# Patient Record
Sex: FEMALE | Race: WHITE | Employment: UNEMPLOYED | URBAN - METROPOLITAN AREA
[De-identification: names, ages, dates, MRNs, and addresses within clinical notes are randomized per-mention and may not be internally consistent; named-entity substitution may affect disease eponyms.]

---

## 2020-09-17 ENCOUNTER — VIRTUAL VISIT (OUTPATIENT)
Dept: FAMILY MEDICINE CLINIC | Age: 65
End: 2020-09-17

## 2020-09-17 DIAGNOSIS — E55.9 VITAMIN D DEFICIENCY: ICD-10-CM

## 2020-09-17 DIAGNOSIS — L30.9 ECZEMA, UNSPECIFIED TYPE: ICD-10-CM

## 2020-09-17 DIAGNOSIS — R91.1 PULMONARY NODULE: ICD-10-CM

## 2020-09-17 DIAGNOSIS — E89.0 POSTOPERATIVE HYPOTHYROIDISM: ICD-10-CM

## 2020-09-17 DIAGNOSIS — F31.81 BIPOLAR 2 DISORDER, MAJOR DEPRESSIVE EPISODE (HCC): ICD-10-CM

## 2020-09-17 DIAGNOSIS — M51.37 DEGENERATION OF LUMBAR OR LUMBOSACRAL INTERVERTEBRAL DISC: ICD-10-CM

## 2020-09-17 DIAGNOSIS — M35.9 AUTOIMMUNE DISEASE (HCC): ICD-10-CM

## 2020-09-17 DIAGNOSIS — I10 ESSENTIAL HYPERTENSION: ICD-10-CM

## 2020-09-17 DIAGNOSIS — J44.9 CHRONIC OBSTRUCTIVE PULMONARY DISEASE, UNSPECIFIED COPD TYPE (HCC): ICD-10-CM

## 2020-09-17 DIAGNOSIS — E78.2 MIXED HYPERLIPIDEMIA: ICD-10-CM

## 2020-09-17 DIAGNOSIS — E66.01 OBESITY, MORBID, BMI 40.0-49.9 (HCC): ICD-10-CM

## 2020-09-17 DIAGNOSIS — G56.02 CARPAL TUNNEL SYNDROME OF LEFT WRIST: ICD-10-CM

## 2020-09-17 DIAGNOSIS — M17.12 PRIMARY OSTEOARTHRITIS OF LEFT KNEE: ICD-10-CM

## 2020-09-17 DIAGNOSIS — K21.9 GASTROESOPHAGEAL REFLUX DISEASE, ESOPHAGITIS PRESENCE NOT SPECIFIED: ICD-10-CM

## 2020-09-17 DIAGNOSIS — E11.9 TYPE 2 DIABETES MELLITUS WITHOUT COMPLICATION, WITHOUT LONG-TERM CURRENT USE OF INSULIN (HCC): Primary | ICD-10-CM

## 2020-09-17 DIAGNOSIS — M79.7 FIBROMYALGIA: ICD-10-CM

## 2020-09-17 PROBLEM — E03.9 HYPOTHYROIDISM: Status: ACTIVE | Noted: 2018-10-19

## 2020-09-17 PROBLEM — F41.9 ANXIETY: Status: ACTIVE | Noted: 2018-10-29

## 2020-09-17 PROBLEM — F10.11 ALCOHOL ABUSE, IN REMISSION: Status: ACTIVE | Noted: 2018-10-29

## 2020-09-17 PROBLEM — M17.11 OSTEOARTHRITIS OF RIGHT KNEE: Status: ACTIVE | Noted: 2017-10-01

## 2020-09-17 PROBLEM — F10.11 ALCOHOL ABUSE, IN REMISSION: Status: RESOLVED | Noted: 2018-10-29 | Resolved: 2020-09-17

## 2020-09-17 PROBLEM — Q79.0 BOCHDALEK HERNIA: Status: ACTIVE | Noted: 2020-02-18

## 2020-09-17 RX ORDER — HYDROCHLOROTHIAZIDE 12.5 MG/1
12.5 CAPSULE ORAL DAILY
COMMUNITY
Start: 2020-04-29 | End: 2020-09-17 | Stop reason: SDUPTHER

## 2020-09-17 RX ORDER — PANTOPRAZOLE SODIUM 40 MG/1
40 TABLET, DELAYED RELEASE ORAL DAILY
Qty: 90 TAB | Refills: 0 | Status: SHIPPED | OUTPATIENT
Start: 2020-09-17 | End: 2021-02-02

## 2020-09-17 RX ORDER — ALBUTEROL SULFATE 90 UG/1
AEROSOL, METERED RESPIRATORY (INHALATION)
COMMUNITY
Start: 2019-12-16 | End: 2021-11-29

## 2020-09-17 RX ORDER — ATORVASTATIN CALCIUM 40 MG/1
40 TABLET, FILM COATED ORAL DAILY
Qty: 90 TAB | Refills: 0 | Status: SHIPPED | OUTPATIENT
Start: 2020-09-17 | End: 2020-11-05

## 2020-09-17 RX ORDER — LISINOPRIL 10 MG/1
10 TABLET ORAL DAILY
Qty: 90 TAB | Refills: 0 | Status: SHIPPED | OUTPATIENT
Start: 2020-09-17 | End: 2020-11-05

## 2020-09-17 RX ORDER — NAPROXEN 500 MG/1
500 TABLET ORAL 2 TIMES DAILY WITH MEALS
Qty: 180 TAB | Refills: 0 | Status: SHIPPED | OUTPATIENT
Start: 2020-09-17 | End: 2020-11-05

## 2020-09-17 RX ORDER — PANTOPRAZOLE SODIUM 40 MG/1
40 TABLET, DELAYED RELEASE ORAL DAILY
COMMUNITY
Start: 2019-07-23 | End: 2020-09-17 | Stop reason: SDUPTHER

## 2020-09-17 RX ORDER — LEVOTHYROXINE SODIUM 112 UG/1
112 TABLET ORAL
Qty: 90 TAB | Refills: 0 | Status: SHIPPED | OUTPATIENT
Start: 2020-09-17 | End: 2020-11-05

## 2020-09-17 RX ORDER — ALPRAZOLAM 0.5 MG/1
TABLET ORAL
COMMUNITY
Start: 2020-09-12

## 2020-09-17 RX ORDER — HYDROCHLOROTHIAZIDE 12.5 MG/1
12.5 CAPSULE ORAL DAILY
Qty: 90 CAP | Refills: 0 | Status: SHIPPED | OUTPATIENT
Start: 2020-09-17 | End: 2020-11-05

## 2020-09-17 RX ORDER — HALOBETASOL PROPIONATE 0.5 MG/G
OINTMENT TOPICAL 2 TIMES DAILY
Qty: 50 G | Refills: 0 | Status: SHIPPED | OUTPATIENT
Start: 2020-09-17 | End: 2022-08-23 | Stop reason: SDUPTHER

## 2020-09-17 RX ORDER — NAPROXEN 500 MG/1
500 TABLET ORAL 2 TIMES DAILY WITH MEALS
COMMUNITY
Start: 2020-04-29 | End: 2020-09-17 | Stop reason: SDUPTHER

## 2020-09-17 RX ORDER — CHOLECALCIFEROL (VITAMIN D3) 125 MCG
CAPSULE ORAL DAILY
COMMUNITY

## 2020-09-17 NOTE — PROGRESS NOTES
[address] [place] [address] 86      Kelly Trinidad is a 71 y.o. female who was seen by synchronous (real-time) audio-video technology on 9/17/2020. Consent: Kelly Trinidad, who was seen by synchronous (real-time) audio-video technology, and/or her healthcare decision maker, is aware that this patient-initiated, Telehealth encounter on 9/17/2020 is a billable service, with coverage as determined by her insurance carrier. She is aware that she may receive a bill and has provided verbal consent to proceed: Yes. Assessment & Plan:   Diagnoses and all orders for this visit:    1. Type 2 diabetes mellitus without complication, without long-term current use of insulin (HCC)  -     HEMOGLOBIN A1C WITH EAG; Future  -     MICROALBUMIN, UR, RAND W/ MICROALB/CREAT RATIO; Future  Per chart review she was on metformin 500 daily, currently not on medications  F/u A1C and urine microalbumin    2. Bipolar 2 disorder, major depressive episode ([facility]  In the process of getting established with [facility]  She under stands I do not prescribe controlled substances, xanax is part of her current med profile    3. Essential hypertension  -     lisinopriL (PRINIVIL, ZESTRIL) 10 mg tablet; Take 1 Tab by mouth daily. -     hydroCHLOROthiazide (MICROZIDE) 12.5 mg capsule; Take 1 Cap by mouth daily.  -     METABOLIC PANEL, COMPREHENSIVE; Future  Last office check in [place] b/p was 139/83  Continue same meds  Obtain labs    4. Mixed hyperlipidemia  -     atorvastatin (LIPITOR) 40 mg tablet; Take 1 Tab by mouth daily.  -     LIPID PANEL; Future  F/u lipids, continue atorvastatin    5. Chronic obstructive pulmonary disease, unspecified COPD type ([facility]  -     CBC W/O DIFF;  Future  -CT scan chest  Hx copd, check cbc  Currently only using albuterol PRN  PFT done in the past were negative  CT scan 2/2020 in [place]:   Findings:  Cardiac size is within normal limits.  Coronary artery calcifications. No significant mediastinal adenopathy. 4 mm nodule, image 38 in the right lower lobe. Minimal scattered areas  of parenchymal scarring in the right upper lobe, lingular segment and  lower lobes.  Minimal pleural thickening on the right.  No  consolidation.  No pleural effusion. 6. Pulmonary nodule  Due f/u CT scan for monitoring    7. Postoperative hypothyroidism  -     levothyroxine (SYNTHROID) 112 mcg tablet; Take 1 Tab by mouth Daily (before breakfast). -     TSH 3RD GENERATION; Future  -     T4, FREE; Future  Med refill  Check labs    8. Autoimmune disease ([facility]  She states this was acquired after having metal jaw implants and some of the components leached into her system    9. Fibromyalgia  -     naproxen (NAPROSYN) 500 mg tablet; Take 1 Tab by mouth two (2) times daily (with meals). -     REFERRAL TO PAIN MANAGEMENT  Med refill  Refer to pain management per her request    10. Obesity, morbid, BMI 40.0-49.9 ([facility]  As stated in HPI, otherwise all others negative. 11. Carpal tunnel syndrome of left wrist  -     REFERRAL TO ORTHOPEDICS  She has had surgery on the left with good results  Now with problems on the right    12. Primary osteoarthritis of left knee  -     REFERRAL TO ORTHOPEDICS  Refer to ortho for further evaluation    13. Degeneration of lumbar or lumbosacral intervertebral disc  -     REFERRAL TO PAIN MANAGEMENT  Refer to pain management for evaluation and treatment    14. Gastroesophageal reflux disease, esophagitis presence not specified  -     pantoprazole (PROTONIX) 40 mg tablet; Take 1 Tab by mouth daily. Refill provided    15. Eczema, unspecified type  -     halobetasol (ULTRAVATE) 0.05 % ointment; Apply  to affected area two (2) times a day. use thin layer  Refill provided    16.  Vitamin D deficiency  -     VITAMIN D, 25 HYDROXY; Future  F/u vit d level    Follow-up and Dispositions    · Return for ASAP, Labs, b/p check, flu shot, nurse visit, AND 1 month, MAWV, htn dm, referrals, 30 min, VV.             712  Subjective:   Kelly Trinidad is a 71 y.o. female who was seen for   [address] [employer] (Moved back to [place]- moved from in [place]) and Not iT (is due)      She is out of all of her medications  Moved here from [place], has lived in [place] previously  Had PCP in [place], JAMES Us Seat down 7 times the past winter, she cannot handle the cold so she moved back to [place]  shares an apartment with her [facility]  Last visit with PCP in [place] was 2/2020, these records are available in care everywhere        Prior to Admission medications    Medication Sig Start Date End Date Taking? Authorizing Provider   ALPRAZolam Mitesh Ghee) 0.5 mg tablet  9/12/20  Yes Provider, Historical   cholecalciferol, vitamin D3, (Vitamin D3) 50 mcg (2,000 unit) tab Take  by mouth daily. Yes Provider, Historical   albuterol (Ventolin HFA) 90 mcg/actuation inhaler INHALE 2 PUFFS BY MOUTH EVERY 4 HOURS AS NEEDED FOR WHEEZE 12/16/19  Yes Provider, Historical   lisinopriL (PRINIVIL, ZESTRIL) 10 mg tablet Take 1 Tab by mouth daily. 9/17/20  Yes Hilary Mata NP   naproxen (NAPROSYN) 500 mg tablet Take 1 Tab by mouth two (2) times daily (with meals). 9/17/20  Yes Hilary Mata NP   hydroCHLOROthiazide (MICROZIDE) 12.5 mg capsule Take 1 Cap by mouth daily. 9/17/20  Yes Hilary Mata NP   levothyroxine (SYNTHROID) 112 mcg tablet Take 1 Tab by mouth Daily (before breakfast). 9/17/20  Yes Hilary Mata NP   pantoprazole (PROTONIX) 40 mg tablet Take 1 Tab by mouth daily. 9/17/20  Yes Hilary Mata NP   halobetasol (ULTRAVATE) 0.05 % ointment Apply  to affected area two (2) times a day. use thin layer 9/17/20  Yes Hilary Mata NP   atorvastatin (LIPITOR) 40 mg tablet Take 1 Tab by mouth daily. 9/17/20  Yes Hilary Mata NP   QUEtiapine (SEROQUEL) 200 mg tablet Take 200 mg by mouth two (2) times a day. Yes Provider, Historical   lamoTRIgine (LAMICTAL) 200 mg tablet Take  by mouth daily. Yes Provider, Historical   doxepin (SINEQUAN) 50 mg capsule Take  by mouth nightly. Yes Provider, Historical   brimonidine-timolol (COMBIGAN) 0.2-0.5 % drop ophthalmic solution 1 Drop every twelve (12) hours. Yes Provider, Historical   KRILL OIL PO Take 300 mg by mouth. Yes Provider, Historical   VITAMIN B COMPLEX (B-50 COMPLEX PO) Take  by mouth daily. Yes Provider, Historical   cycloSPORINE (RESTASIS) 0.05 % ophthalmic emulsion Administer 1 Drop to both eyes two (2) times a day. Indications: DRY EYE   Yes Makeda Milton MD   travoprost (TRAVATAN Z) 0.004 % ophthalmic solution Administer 1 Drop to both eyes every evening. Yes Makeda Milton MD   carboxymethylcellulose sodium (REFRESH LIQUIGEL) 1 % Drop Apply  to eye.    Yes Makeda Milton MD     Allergies   Allergen Reactions    Aluminum Anaphylaxis    Chromium Anaphylaxis    Cobalt Anaphylaxis    Copper Anaphylaxis    Cromolyn Anaphylaxis    [employer] Resources Anaphylaxis     Medical mesh, proplast and juan    Nickel Anaphylaxis    Zinc Anaphylaxis    Adhesive Rash       Patient Active Problem List   Diagnosis Code    Glaucoma H40.9    Primary osteoarthritis of left knee M17.12    TMJ dysfunction M26.609    Lumbago M54.5    Bipolar 2 disorder, major depressive episode ([address] F31.81    Schizophrenia ([address] F20.9    Carpal tunnel syndrome G56.00    Cervicalgia M54.2    Myalgia and myositis, unspecified [id number]    Lumbosacral spondylosis without myelopathy M47.817    Degeneration of lumbar or lumbosacral intervertebral disc M51.37    Migraine without aura, without mention of intractable migraine without mention of status migrainosus G43.009    Encounter for long-term (current) use of other medications Z79.899    Hip bursitis M70.70    Bilateral knee pain M25.561, M25.562    Hypertension I10    Tinnitus H93.19    Hyperlipemia E78.5    Autoimmune disease ([facility] M35.9    Type 2 diabetes mellitus without complication, without long-term current use of insulin ([facility]) E11.9    S/P thyroidectomy Z98.890    Osteoarthritis of right knee M17.11    Obesity, morbid, BMI 40.0-49.9 ([facility]) E66.01    Hypothyroidism E03.9    GERD (gastroesophageal reflux disease) K21.9    Bochdalek hernia Q79.0    Anxiety F41.9    Pulmonary nodule R91.1    Fibromyalgia M79.7    Bilateral cataracts H26.9     Past Medical History:   Diagnosis Date    Allergic rhinitis     Anxiety disorder     Arthritis 2/29/2012    Arthritis of knee     left  greater than right    Autoimmune disease ([address]     Back pain     Bilateral cataracts     Bipolar 1 disorder ([address] 4/12/2013    Bipolar disorder ([address]     Chronic pain     Constipation, chronic     COPD (chronic obstructive pulmonary disease) ([facility])     Depression     Eczema     Fibromyalgia     Gastritis     GERD (gastroesophageal reflux disease)     Glaucoma 2/29/2012    Glaucoma     Hallux valgus     left foot    Hypertension     IBS (irritable bowel syndrome)     Ill-defined condition     Lumbago     Migraine headache     Nervous disorder     Neuritis     Osteoarthritis     Pain in joint, lower leg     Pain in joint, shoulder region     Pelvic relaxation disorder     Personality disorder ([address]     Psychiatric disorder     Bipolar    Psychosis ([address] 2/29/2012    PTSD (post-traumatic stress disorder)     Schizophrenia ([address] 2/29/2012    Scoliosis     Spondylolisthesis     TMJ dysfunction     Trochanteric bursitis     Ulcers of both great toes ([facility])     Vertigo      Past Surgical History:   Procedure Laterality Date    HX ABDOMINOPLASTY      HX BREAST REDUCTION Bilateral 1997    HX BUNIONECTOMY      left foot    HX CYSTOCELE REPAIR  1994    HX HAMMER TOE REPAIR      HX KNEE ARTHROSCOPY      HX OTHER SURGICAL      jaw implant    HX TONSILLECTOMY      HX TOTAL VAGINAL HYSTERECTOMY  1994 prolaplsed ut    REPAIR OF RECTOCELE  1994     Family History   Problem Relation Age of Onset    Heart Disease Father     Diabetes Sister     Headache Sister     Suicide Brother     Substance Abuse Paternal Uncle     High Cholesterol Sister      Social History     Tobacco Use    Smoking status: Former Smoker     Packs/day: 0.50     Years: 22.00     Pack years: 11.00     Types: Cigarettes    Smokeless tobacco: Current User    Tobacco comment: Electronic cigarettes   Substance Use Topics    Alcohol use: No       Review of Systems   Constitutional: Negative. HENT:        History of jaw implants with complications, products of the implant have leached into her system, endorses some continued pain but bearable   Respiratory: Negative. 6mm pulm nodule found on ct scan 2/2020   Cardiovascular: Negative. Gastrointestinal: Positive for heartburn. Bochdalek hernia found on ct scan 2/2020   Genitourinary: Negative. Musculoskeletal: Positive for back pain, joint pain and neck pain. Bilat knee pain, DDD lumbar spine, hip burisits   Skin:        Hx of eczema   Endo/Heme/Allergies: Negative. Hx of DM, managed with diet   Psychiatric/Behavioral: Positive for depression. The patient is nervous/anxious. Awaiting an appt with [facility]         Objective:     Visit Vitals  LMP 03/03/1994      General: alert, cooperative, no distress   Mental  status: normal mood, behavior, speech, dress, motor activity, and thought processes, able to follow commands   HENT: NCAT   Neck: no visualized mass   Resp: no respiratory distress   Neuro: no gross deficits   Skin: no discoloration or lesions of concern on visible areas   Psychiatric: normal affect, consistent with stated mood, no evidence of hallucinations     Additional exam findings: We discussed the expected course, resolution and complications of the diagnosis(es) in detail.   Medication risks, benefits, costs, interactions, and alternatives were discussed as indicated. I advised her to contact the office if her condition worsens, changes or fails to improve as anticipated. She expressed understanding with the diagnosis(es) and plan. Tonny Aragon is a 71 y.o. female who was evaluated by a video visit encounter for concerns as above. Patient identification was verified prior to start of the visit. A caregiver was present when appropriate. Due to this being a TeleHealth encounter (During [facility] emergency), evaluation of the following organ systems was limited: Vitals/Constitutional/EENT/Resp/CV/GI//MS/Neuro/Skin/Heme-Lymph-Imm. Pursuant to the emergency declaration under the [facility], [facility] waiver authority and the [employer] Act, this Virtual  Visit was conducted, with patient's (and/or legal guardian's) consent, to reduce the patient's risk of exposure to COVID-19 and provide necessary medical care. Services were provided through a video synchronous discussion virtually to substitute for in-person clinic visit. Patient and provider were located at their individual homes. An After Visit Summary was printed and given to the patient. All diagnosis have been discussed with the patient and all of the patient's questions have been answered. Follow-up and Dispositions    · Return for ASAP, Labs, b/p check, flu shot, nurse visit, AND 1 month, MAWV, htn dm, referrals, 30 min, VV. Cooper Torrez, [facility]   Harish Angel

## 2020-09-17 NOTE — PROGRESS NOTES
Chief Complaint   Patient presents with   [address] back to [place]- moved from in [facility] Visit     is due         1. Have you been to the ER, urgent care clinic since your last visit? Hospitalized since your last visit? No    2. Have you seen or consulted any other health care providers outside of the [address] [place] [address] since your last visit? Include any pap smears or colon screening.  [facility]

## 2020-09-30 RX ORDER — QUETIAPINE FUMARATE 200 MG/1
200 TABLET, FILM COATED ORAL DAILY
Qty: 30 TAB | Refills: 0 | Status: SHIPPED | OUTPATIENT
Start: 2020-09-30

## 2020-09-30 RX ORDER — LAMOTRIGINE 200 MG/1
200 TABLET ORAL DAILY
Qty: 30 TAB | Refills: 0 | Status: SHIPPED | OUTPATIENT
Start: 2020-09-30

## 2020-10-02 ENCOUNTER — APPOINTMENT (OUTPATIENT)
Dept: FAMILY MEDICINE CLINIC | Age: 65
End: 2020-10-02

## 2020-10-02 ENCOUNTER — HOSPITAL ENCOUNTER (OUTPATIENT)
Dept: LAB | Age: 65
Discharge: HOME | End: 2020-10-02
Payer: MEDICARE

## 2020-10-02 ENCOUNTER — CLINICAL SUPPORT (OUTPATIENT)
Dept: FAMILY MEDICINE CLINIC | Age: 65
End: 2020-10-02
Payer: MEDICARE

## 2020-10-02 VITALS — SYSTOLIC BLOOD PRESSURE: 126 MMHG | DIASTOLIC BLOOD PRESSURE: 78 MMHG

## 2020-10-02 DIAGNOSIS — E55.9 VITAMIN D DEFICIENCY: ICD-10-CM

## 2020-10-02 DIAGNOSIS — E89.0 POSTOPERATIVE HYPOTHYROIDISM: ICD-10-CM

## 2020-10-02 DIAGNOSIS — I10 ESSENTIAL HYPERTENSION: Primary | ICD-10-CM

## 2020-10-02 DIAGNOSIS — E11.9 TYPE 2 DIABETES MELLITUS WITHOUT COMPLICATION, WITHOUT LONG-TERM CURRENT USE OF INSULIN (HCC): ICD-10-CM

## 2020-10-02 DIAGNOSIS — Z23 NEEDS FLU SHOT: ICD-10-CM

## 2020-10-02 DIAGNOSIS — I10 ESSENTIAL HYPERTENSION: ICD-10-CM

## 2020-10-02 DIAGNOSIS — Z23 ENCOUNTER FOR IMMUNIZATION: ICD-10-CM

## 2020-10-02 DIAGNOSIS — E78.2 MIXED HYPERLIPIDEMIA: ICD-10-CM

## 2020-10-02 DIAGNOSIS — J44.9 CHRONIC OBSTRUCTIVE PULMONARY DISEASE, UNSPECIFIED COPD TYPE (HCC): ICD-10-CM

## 2020-10-02 LAB
25(OH)D3 SERPL-MCNC: 34.2 NG/ML (ref 30–100)
ALBUMIN SERPL-MCNC: 3.9 G/DL (ref 3.4–5)
ALBUMIN/GLOB SERPL: 1.3 {RATIO} (ref 0.8–1.7)
ALP SERPL-CCNC: 110 U/L (ref 45–117)
ALT SERPL-CCNC: 26 U/L (ref 13–56)
ANION GAP SERPL CALC-SCNC: 6 MMOL/L (ref 3–18)
AST SERPL-CCNC: 12 U/L (ref 10–38)
BILIRUB SERPL-MCNC: 0.6 MG/DL (ref 0.2–1)
BUN SERPL-MCNC: 26 MG/DL (ref 7–18)
BUN/CREAT SERPL: 30 (ref 12–20)
CALCIUM SERPL-MCNC: 9.2 MG/DL (ref 8.5–10.1)
CHLORIDE SERPL-SCNC: 105 MMOL/L (ref 100–111)
CHOLEST SERPL-MCNC: 182 MG/DL
CO2 SERPL-SCNC: 30 MMOL/L (ref 21–32)
CREAT SERPL-MCNC: 0.86 MG/DL (ref 0.6–1.3)
CREAT UR-MCNC: 118 MG/DL (ref 30–125)
ERYTHROCYTE [DISTWIDTH] IN BLOOD BY AUTOMATED COUNT: 16.1 % (ref 11.6–14.5)
EST. AVERAGE GLUCOSE BLD GHB EST-MCNC: 146 MG/DL
GLOBULIN SER CALC-MCNC: 3 G/DL (ref 2–4)
GLUCOSE SERPL-MCNC: 126 MG/DL (ref 74–99)
HBA1C MFR BLD: 6.7 % (ref 4.2–5.6)
HCT VFR BLD AUTO: 37.4 % (ref 35–45)
HDLC SERPL-MCNC: 48 MG/DL (ref 40–60)
HDLC SERPL: 3.8 {RATIO} (ref 0–5)
HGB BLD-MCNC: 11.4 G/DL (ref 12–16)
LDLC SERPL CALC-MCNC: 108.8 MG/DL (ref 0–100)
LIPID PROFILE,FLP: ABNORMAL
MCH RBC QN AUTO: 27.3 PG (ref 24–34)
MCHC RBC AUTO-ENTMCNC: 30.5 G/DL (ref 31–37)
MCV RBC AUTO: 89.7 FL (ref 74–97)
MICROALBUMIN UR-MCNC: 0.75 MG/DL (ref 0–3)
MICROALBUMIN/CREAT UR-RTO: 6 MG/G (ref 0–30)
PLATELET # BLD AUTO: 370 K/UL (ref 135–420)
PMV BLD AUTO: 11.5 FL (ref 9.2–11.8)
POTASSIUM SERPL-SCNC: 4 MMOL/L (ref 3.5–5.5)
PROT SERPL-MCNC: 6.9 G/DL (ref 6.4–8.2)
RBC # BLD AUTO: 4.17 M/UL (ref 4.2–5.3)
SODIUM SERPL-SCNC: 141 MMOL/L (ref 136–145)
T4 FREE SERPL-MCNC: 0.9 NG/DL (ref 0.7–1.5)
TRIGL SERPL-MCNC: 126 MG/DL (ref ?–150)
TSH SERPL DL<=0.05 MIU/L-ACNC: 19.8 UIU/ML (ref 0.36–3.74)
VLDLC SERPL CALC-MCNC: 25.2 MG/DL
WBC # BLD AUTO: 8 K/UL (ref 4.6–13.2)

## 2020-10-02 PROCEDURE — 84439 ASSAY OF FREE THYROXINE: CPT

## 2020-10-02 PROCEDURE — 85027 COMPLETE CBC AUTOMATED: CPT

## 2020-10-02 PROCEDURE — 36415 COLL VENOUS BLD VENIPUNCTURE: CPT

## 2020-10-02 PROCEDURE — 80053 COMPREHEN METABOLIC PANEL: CPT

## 2020-10-02 PROCEDURE — 90653 IIV ADJUVANT VACCINE IM: CPT | Performed by: NURSE PRACTITIONER

## 2020-10-02 PROCEDURE — 80061 LIPID PANEL: CPT

## 2020-10-02 PROCEDURE — 82043 UR ALBUMIN QUANTITATIVE: CPT

## 2020-10-02 PROCEDURE — G0008 ADMIN INFLUENZA VIRUS VAC: HCPCS | Performed by: NURSE PRACTITIONER

## 2020-10-02 PROCEDURE — 83036 HEMOGLOBIN GLYCOSYLATED A1C: CPT

## 2020-10-02 PROCEDURE — 84443 ASSAY THYROID STIM HORMONE: CPT

## 2020-10-02 PROCEDURE — 82306 VITAMIN D 25 HYDROXY: CPT

## 2020-10-02 NOTE — PROGRESS NOTES
After obtaining consent, and per orders of NP Olivia Hernandez, injection of influenza vaccine given by Peyton Garcia LPN. Patient tolerated injection well with no signs of adverse reactions noted. Patient rested for 5 minutes prior to BP check. Patient seated with feet flat on floor. BP reading placed in chart.

## 2020-10-02 NOTE — PATIENT INSTRUCTIONS
Vaccine Information Statement    Influenza (Flu) Vaccine (Inactivated or Recombinant): What You Need to Know    Many Vaccine Information Statements are available in Amharic and other languages. See www.immunize.org/vis  Hojas de información sobre vacunas están disponibles en español y en muchos otros idiomas. Visite www.immunize.org/vis    1. Why get vaccinated? Influenza vaccine can prevent influenza (flu). Flu is a contagious disease that spreads around the [facility] every year, usually between October and May. Anyone can get the flu, but it is more dangerous for some people. Infants and young children, people 72years of age and older, pregnant women, and people with certain health conditions or a weakened immune system are at greatest risk of flu complications. Pneumonia, bronchitis, sinus infections and ear infections are examples of flu-related complications. If you have a medical condition, such as heart disease, cancer or diabetes, flu can make it worse. Flu can cause fever and chills, sore throat, muscle aches, fatigue, cough, headache, and runny or stuffy nose. Some people may have vomiting and diarrhea, though this is more common in children than adults. Each year thousands of people in the [facility] die from flu, and many more are hospitalized. Flu vaccine prevents millions of illnesses and flu-related visits to the doctor each year. 2. Influenza vaccines     CDC recommends everyone 10months of age and older get vaccinated every flu season. Children 6 months through 6years of age may need 2 doses during a single flu season. Everyone else needs only 1 dose each flu season. It takes about 2 weeks for protection to develop after vaccination. There are many flu viruses, and they are always changing. Each year a new flu vaccine is made to protect against three or four viruses that are likely to cause disease in the upcoming flu season.  Even when the vaccine doesnt exactly match these viruses, it may still provide some protection. Influenza vaccine does not cause flu. Influenza vaccine may be given at the same time as other vaccines. 3. Talk with your health care provider    Tell your vaccine provider if the person getting the vaccine:   Has had an allergic reaction after a previous dose of influenza vaccine, or has any severe, life-threatening allergies.  Has ever had Guillain-Barré Syndrome (also called GBS). In some cases, your health care provider may decide to postpone influenza vaccination to a future visit. People with minor illnesses, such as a cold, may be vaccinated. People who are moderately or severely ill should usually wait until they recover before getting influenza vaccine. Your health care provider can give you more information. 4. Risks of a reaction     Soreness, redness, and swelling where shot is given, fever, muscle aches, and headache can happen after influenza vaccine.  There may be a very small increased risk of Guillain-Barré Syndrome (GBS) after inactivated influenza vaccine (the flu shot). [facility] children who get the flu shot along with pneumococcal vaccine (PCV13), and/or DTaP vaccine at the same time might be slightly more likely to have a seizure caused by fever. Tell your health care provider if a child who is getting flu vaccine has ever had a seizure. People sometimes faint after medical procedures, including vaccination. Tell your provider if you feel dizzy or have vision changes or ringing in the ears. As with any medicine, there is a very remote chance of a vaccine causing a severe allergic reaction, other serious injury, or death. 5. What if there is a serious problem? An allergic reaction could occur after the vaccinated person leaves the clinic.  If you see signs of a severe allergic reaction (hives, swelling of the face and throat, difficulty breathing, a fast heartbeat, dizziness, or weakness), call 9-1-1 and get the person to the nearest hospital.    For other signs that concern you, call your health care provider. Adverse reactions should be reported to the Vaccine Adverse Event Reporting System (VAERS). Your health care provider will usually file this report, or you can do it yourself. Visit the VAERS website at www.vaers. [facility].gov or call 9-604.758.6945. VAERS is only for reporting reactions, and VAERS staff do not give medical advice. 6. The National Vaccine Injury Compensation Program    The [facility] Vaccine Injury Compensation Program (VICP) is a federal program that was created to compensate people who may have been injured by certain vaccines. Visit the VICP website at www.[facility].gov/vaccinecompensation or call 1-964.741.5251 to learn about the program and about filing a claim. There is a time limit to file a claim for compensation. 7. How can I learn more?  Ask your health care provider.  Call your local or state health department.  Contact the Centers for Disease Control and Prevention (CDC):  - Call 1-512.895.4472 (1-800-CDC-INFO) or  - Visit CDCs influenza website at www.cdc.gov/flu    Vaccine Information Statement (Interim)  Inactivated Influenza Vaccine   8/15/2019  [id number]   Department of Health and Human Services  Centers for Disease Control and Prevention    Office Use Only

## 2020-10-08 NOTE — PROGRESS NOTES
Diabetes is stable  TSH is high, verify if she has been taking her medication daily on an empty stomach  Rest of her labs are normal or within acceptable ranges

## 2020-10-12 ENCOUNTER — HOSPITAL ENCOUNTER (OUTPATIENT)
Dept: CT IMAGING | Age: 65
Discharge: HOME | End: 2020-10-12
Attending: NURSE PRACTITIONER
Payer: MEDICARE

## 2020-10-12 DIAGNOSIS — R91.1 PULMONARY NODULE: ICD-10-CM

## 2020-10-12 PROCEDURE — 71250 CT THORAX DX C-: CPT

## 2020-10-13 NOTE — PROGRESS NOTES
[facility]  [address], [address]  Phone: [phone]  Fax: [phone]        INITIAL CONSULTATION      HISTORY OF PRESENT ILLNESS:  Derrick Sommer is a 71 y.o. female whom is referred from Whitman Nageotte, NP secondary to progressive lower back pain x 25 years without specific trauma. She rates her pain 10/10. She really has diffuse widespread pain complaints. She denies radicular symptoms. Her pain is not exacerbated positionally. She has treated with Hydrocodone. She previously [facility]. Her most recent injection was 2018. Pt reports she previously received a spinal epidural by Dr. Alice Thompson and experienced bladder incontinence. Pt also underwent [facility] with benefit. She completed PT in 2019. She performs her HEP 3 x/week. Patient denies previous spinal surgery or chiropractic care. Pt denies change in bowel or bladder habits. Pt denies fever, weight loss, or skin changes. PmHx of fibromyalgia, obesity, DM, migraine HA's, myalgias, myositis, schizophrenia, bipolar disorder, glaucoma. Pt was previously followed by the center for pain management. She is followed by Dr. Robina Vásquez with [facility] [place]. The patient has a history of DM and reports blood sugars are well controlled, consistently remaining below 200. Note from Dr. Sotero Nava dated 12/15/2013 indicated she had 20 year h/o chronic low back pain. He felt she would be a poor surgical candidate. He saw no neurologic issues at that time. He did not feel there was a true indication for surgery at that time. Recommended avoiding surgery. Recommended she return to Dr. Patrick Jones for pain management. Note from Whitman Nageotte, NP dated 9/17/2020 indicating patient has DM, bipolar disorder, and fibromyalgia. Indicated the pt had been referred to pain management.  I discussed the pain management referral with the pt and she reports she did not know about it. C spine MRI dated 6/5/2015 films not independently reviewed. Per report, minor and mild degenerative changes. Questioned thyroid nodule. L spine MRI dated 5/4/2015 films not independently reviewed. Per report, multilevel degenerative changes without significant spinal canal stenosis. Right neural foraminal narrowing at L5/S1. Scoliosis. Hepatomegaly. Suspected left diaphragmatic hernia. The patient is RHD.  reviewed. Body mass index is 43.26 kg/m².     PCP: Sofia Salguero NP    Past Medical History:   Diagnosis Date    Allergic rhinitis     Anxiety disorder     Arthritis 2/29/2012    Arthritis of knee     left  greater than right    Autoimmune disease (HCC)     Back pain     Bilateral cataracts     Bipolar 1 disorder ([address] 4/12/2013    Bipolar disorder ([address]     Chronic pain     Constipation, chronic     COPD (chronic obstructive pulmonary disease) (HCC)     Depression     Eczema     Fibromyalgia     Gastritis     GERD (gastroesophageal reflux disease)     Glaucoma 2/29/2012    Glaucoma     Hallux valgus     left foot    Hypertension     IBS (irritable bowel syndrome)     Ill-defined condition     Lumbago     Migraine headache     Nervous disorder     Neuritis     Osteoarthritis     Pain in joint, lower leg     Pain in joint, shoulder region     Pelvic relaxation disorder     Personality disorder ([address]     Psychiatric disorder     Bipolar    Psychosis ([address] 2/29/2012    PTSD (post-traumatic stress disorder)     Schizophrenia ([address] 2/29/2012    Scoliosis     Spondylolisthesis     TMJ dysfunction     Trochanteric bursitis     Ulcers of both great toes (HCC)     Vertigo    th d  Past Surgical History:   Procedure Laterality Date    HX ABDOMINOPLASTY      HX BREAST REDUCTION Bilateral 1997    HX BUNIONECTOMY      left foot    HX CYSTOCELE REPAIR  1994    HX HAMMER TOE REPAIR      HX KNEE ARTHROSCOPY      HX OTHER SURGICAL      jaw implant    HX TONSILLECTOMY      HX TOTAL VAGINAL HYSTERECTOMY  1994    prolaplsed ut    REPAIR OF RECTOCELE  1994   [place].  albuterol (Ventolin HFA) 90 mcg/actuation inhaler INHALE 2 PUFFS BY MOUTH EVERY 4 HOURS AS NEEDED FOR WHEEZE      lisinopriL (PRINIVIL, ZESTRIL) 10 mg tablet Take 1 Tab by mouth daily. 90 Tab 0    naproxen (NAPROSYN) 500 mg tablet Take 1 Tab by mouth two (2) times daily (with meals). 180 Tab 0    hydroCHLOROthiazide (MICROZIDE) 12.5 mg capsule Take 1 Cap by mouth daily. 90 Cap 0    levothyroxine (SYNTHROID) 112 mcg tablet Take 1 Tab by mouth Daily (before breakfast). 90 Tab 0    pantoprazole (PROTONIX) 40 mg tablet Take 1 Tab by mouth daily. 90 Tab 0    halobetasol (ULTRAVATE) 0.05 % ointment Apply  to affected area two (2) times a day. use thin layer 50 g 0    atorvastatin (LIPITOR) 40 mg tablet Take 1 Tab by mouth daily. 90 Tab 0    doxepin (SINEQUAN) 50 mg capsule Take  by mouth nightly.  brimonidine-timolol (COMBIGAN) 0.2-0.5 % drop ophthalmic solution 1 Drop every twelve (12) hours.  KRILL OIL PO Take 300 mg by mouth.  VITAMIN B COMPLEX (B-50 COMPLEX PO) Take  by mouth daily.  cycloSPORINE (RESTASIS) 0.05 % ophthalmic emulsion Administer 1 Drop to both eyes two (2) times a day. Indications: DRY EYE      travoprost (TRAVATAN Z) 0.004 % ophthalmic solution Administer 1 Drop to both eyes every evening.  carboxymethylcellulose sodium (REFRESH LIQUIGEL) 1 % Drop Apply  to eye.          Allergies   Allergen Reactions    Aluminum Anaphylaxis    Chromium Anaphylaxis    Cobalt Anaphylaxis    Copper Anaphylaxis    Cromolyn Anaphylaxis    Medical Resources Anaphylaxis     Medical mesh, proplast and juan    Nickel Anaphylaxis    Zinc Anaphylaxis    Adhesive Rash    Benylin Dm Other (comments)     cannot take glaucoma pressure    Flexeril [Cyclobenzaprine] Other (comments)     Metallic taste, dry eyes and mouth, headaches    Gabapentin Other (comments)     Dizziness, clumsiness, confusion            Family History   Problem Relation Age of Onset    Heart Disease Father     Diabetes Sister     Headache Sister     Suicide Brother     Substance Abuse Paternal Uncle     High Cholesterol Sister          REVIEW OF SYSTEMS  Constitutional symptoms: Negative  Eyes: Negative  Ears, Nose, Throat, and Mouth: Negative  Cardiovascular: Negative  Respiratory: Negative  Genitourinary: Negative  Integumentary (Skin and/or breast): Negative  Musculoskeletal: Positive for lower back pain. Extremities: Negative for edema. Endocrine/Rheumatologic: Negative  Hematologic/Lymphatic: Negative  Allergic/Immunologic: Negative  Psychiatric: Negative       PHYSICAL EXAMINATION  Visit Vitals  BP (!) 133/90 (BP 1 Location: Right arm, BP Patient Position: Sitting)   Pulse 68   Temp 97.6 °F (36.4 °C) (Skin)   Ht 5' 4\" (1.626 m)   Wt 252 lb (114.3 kg)   LMP 03/03/1994   SpO2 98%   BMI 43.26 kg/m²       CONSTITUTIONAL: NAD, A&O x 3  HEART: Regular rate and rhythm  GASTROINTESTINAL: Positive bowel sounds, soft, nontender, and nondistended  LUNGS: Clear to auscultation bilaterally. SKIN: Negative for rash. RANGE OF MOTION: The patient has full passive range of motion in all four extremities. SENSATION: Decreased sensation to light touch on the first digit of the LUE. Otherwise, sensation is intact to light touch throughout. MOTOR:   Straight Leg Raise: Negative, bilateral  Rodriguez: Negative, bilateral  Deep tendon reflexes are 0 at the biceps, triceps, and brachioradialis bilaterally. Deep tendon reflexes are 0 at the knees and ankles bilaterally. Ambulates with a single point cane. Presents with a CTS splint LUE.       Shoulder AB/Flex Elbow Flex Wrist Ext Elbow Ext Wrist Flex Hand Intrin Tone   Right +4/5 +4/5 +4/5 +4/5 +4/5 +4/5 +4/5   Left +4/5 +4/5 +4/5 +4/5 +4/5 +4/5 +4/5              Hip Flex Knee Ext Knee Flex Ankle DF GTE Ankle PF Tone   Right +4/5 +4/5 +4/5 +4/5 +4/5 +4/5 +4/5   Left +4/5 +4/5 +4/5 +4/5 +4/5 +4/5 +4/5       RADIOGRAPHS  Preliminary reading of L spine plain films dated 10/15/2020 revealed:  Mild S shaped scoliosis in the thoracolumbar spine apex L3 to the right and T11 to the left. Disc space narrowing in the upper lumbar spine is difficult to assess which I attribute to her curvature. Sacralization of L5 vertebral body. Mild disc space narrowing at L4-S1. Small anterior osteophytes noted in the lower lumbar spine. No acute pathology identified. These are being sent out for official reading by Dr. Sil Montague. ASSESSMENT   Diagnoses and all orders for this visit:    1. Low back pain at multiple sites  -     AMB POC XRAY, SPINE, LUMBOSACRAL; 2 O  -     methylPREDNISolone (MEDROL DOSEPACK) 4 mg tablet; Per dose pack instructions    2. Lumbosacral spondylosis without myelopathy  -     methylPREDNISolone (MEDROL DOSEPACK) 4 mg tablet; Per dose pack instructions    3. DDD (degenerative disc disease), lumbar  -     methylPREDNISolone (MEDROL DOSEPACK) 4 mg tablet; Per dose pack instructions    4. Fibromyalgia  -     methylPREDNISolone (MEDROL DOSEPACK) 4 mg tablet; Per dose pack instructions         IMPRESSIONS/RECOMMENDATIONS:  Patient presents today with c/o lower back pain. Multiple treatment options were discussed. I will order a L spine MRI. I advised patient to bring copies of films to next visit. I will start her on Medrol Dosepak. I recommended she increase the frequency of HEP to daily. I requsted she contact my office with her physicians name from [facility]. Patient is neurologically intact. I will see the patient back following the MRI or earlier if needed. Written by Luis Standard, as dictated by Adrián Stern MD  I examined the patient, reviewed and agree with the note.

## 2020-10-15 ENCOUNTER — OFFICE VISIT (OUTPATIENT)
Dept: ORTHOPEDIC SURGERY | Age: 65
End: 2020-10-15
Payer: MEDICARE

## 2020-10-15 ENCOUNTER — TELEPHONE (OUTPATIENT)
Dept: ORTHOPEDIC SURGERY | Age: 65
End: 2020-10-15

## 2020-10-15 ENCOUNTER — PATIENT MESSAGE (OUTPATIENT)
Dept: FAMILY MEDICINE CLINIC | Age: 65
End: 2020-10-15

## 2020-10-15 VITALS
DIASTOLIC BLOOD PRESSURE: 90 MMHG | OXYGEN SATURATION: 98 % | TEMPERATURE: 97.6 F | HEIGHT: 64 IN | HEART RATE: 68 BPM | SYSTOLIC BLOOD PRESSURE: 133 MMHG | BODY MASS INDEX: 43.02 KG/M2 | WEIGHT: 252 LBS

## 2020-10-15 DIAGNOSIS — M54.50 LOW BACK PAIN AT MULTIPLE SITES: Primary | ICD-10-CM

## 2020-10-15 DIAGNOSIS — M47.817 LUMBOSACRAL SPONDYLOSIS WITHOUT MYELOPATHY: ICD-10-CM

## 2020-10-15 DIAGNOSIS — M79.7 FIBROMYALGIA: ICD-10-CM

## 2020-10-15 DIAGNOSIS — M51.36 DDD (DEGENERATIVE DISC DISEASE), LUMBAR: ICD-10-CM

## 2020-10-15 PROCEDURE — G8399 PT W/DXA RESULTS DOCUMENT: HCPCS | Performed by: PHYSICAL MEDICINE & REHABILITATION

## 2020-10-15 PROCEDURE — G8755 DIAS BP > OR = 90: HCPCS | Performed by: PHYSICAL MEDICINE & REHABILITATION

## 2020-10-15 PROCEDURE — 3017F COLORECTAL CA SCREEN DOC REV: CPT | Performed by: PHYSICAL MEDICINE & REHABILITATION

## 2020-10-15 PROCEDURE — 1100F PTFALLS ASSESS-DOCD GE2>/YR: CPT | Performed by: PHYSICAL MEDICINE & REHABILITATION

## 2020-10-15 PROCEDURE — G8417 CALC BMI ABV UP PARAM F/U: HCPCS | Performed by: PHYSICAL MEDICINE & REHABILITATION

## 2020-10-15 PROCEDURE — 1090F PRES/ABSN URINE INCON ASSESS: CPT | Performed by: PHYSICAL MEDICINE & REHABILITATION

## 2020-10-15 PROCEDURE — 99204 OFFICE O/P NEW MOD 45 MIN: CPT | Performed by: PHYSICAL MEDICINE & REHABILITATION

## 2020-10-15 PROCEDURE — G8752 SYS BP LESS 140: HCPCS | Performed by: PHYSICAL MEDICINE & REHABILITATION

## 2020-10-15 PROCEDURE — 3288F FALL RISK ASSESSMENT DOCD: CPT | Performed by: PHYSICAL MEDICINE & REHABILITATION

## 2020-10-15 PROCEDURE — G9717 DOC PT DX DEP/BP F/U NT REQ: HCPCS | Performed by: PHYSICAL MEDICINE & REHABILITATION

## 2020-10-15 PROCEDURE — 72100 X-RAY EXAM L-S SPINE 2/3 VWS: CPT | Performed by: PHYSICAL MEDICINE & REHABILITATION

## 2020-10-15 PROCEDURE — G8427 DOCREV CUR MEDS BY ELIG CLIN: HCPCS | Performed by: PHYSICAL MEDICINE & REHABILITATION

## 2020-10-15 PROCEDURE — G8536 NO DOC ELDER MAL SCRN: HCPCS | Performed by: PHYSICAL MEDICINE & REHABILITATION

## 2020-10-15 RX ORDER — METHYLPREDNISOLONE 4 MG/1
TABLET ORAL
Qty: 1 DOSE PACK | Refills: 0 | Status: SHIPPED | OUTPATIENT
Start: 2020-10-15 | End: 2021-11-29

## 2020-10-15 NOTE — LETTER
10/15/20 Patient: Jory Bell YOB: 1955 Date of Visit: 10/15/2020 Rashida Medina NP 
[address] [facility] [address] [place] [address] [id number] VIA In Basket Dear Rashida Medina NP, Thank you for referring Ms. Jory Bell to [address] for evaluation. My notes for this consultation are attached. If you have questions, please do not hesitate to call me. I look forward to following your patient along with you. Sincerely, Sunil Kang MD

## 2020-10-15 NOTE — TELEPHONE ENCOUNTER
Patient called to give the name of her previous pain physician, Dr. Richard Laureano.   Fax [phone]    She would like her MRI to be sent to [facility] Fax [phone]    Patient [phone]

## 2020-10-15 NOTE — TELEPHONE ENCOUNTER
MRI of the Lumbar Spine without contrast has been faxed to [facility] for scheduling, [phone], fax [phone]. Patient can self-schedule by calling [facility] directly. Dr. Dhruv Wiggins note indicated he wanted information regarding previous physician for patient but did not indicate why. I will forward this message to nursing staff to determine reasoning.

## 2020-10-16 NOTE — TELEPHONE ENCOUNTER
From: Brandon Meléndez  To: Arleth Pan  Sent: 10/15/2020 8:07 PM EDT  Subject: Test Results Question    Results of Chest CT? Do you have them yet? They are not on my chart.

## 2020-10-16 NOTE — TELEPHONE ENCOUNTER
[facility] calls each patient with orders entered. Orders were forwarded and fax confirmation was received from [facility].

## 2020-10-19 ENCOUNTER — VIRTUAL VISIT (OUTPATIENT)
Dept: FAMILY MEDICINE CLINIC | Age: 65
End: 2020-10-19

## 2020-10-19 ENCOUNTER — TELEPHONE (OUTPATIENT)
Dept: FAMILY MEDICINE CLINIC | Age: 65
End: 2020-10-19

## 2020-10-19 DIAGNOSIS — Z91.199 NO-SHOW FOR APPOINTMENT: Primary | ICD-10-CM

## 2020-10-19 NOTE — PROGRESS NOTES
Called pt and informed her of her lab results- She states has been out of medication. When I spoke with her at check in for VV with CLAUS Hernandez, she stated she was taking medication on an empty stoch. Transferred to CLAUS Hernandez.

## 2020-10-19 NOTE — PROGRESS NOTES
Chief Complaint   Patient presents with    Annual Wellness Visit    Hypertension    Diabetes    Labs    Results     CT Scan          1. Have you been to the ER, urgent care clinic since your last visit? Hospitalized since your last visit? No    2. Have you seen or consulted any other health care providers outside of the [address] [place] [address] since your last visit? Include any pap smears or colon screening.  Dr. Yris Elder     Pt is due for dexa and mammo but pt states has no Copay for testing

## 2020-10-19 NOTE — PROGRESS NOTES
Sent two text messages, and called two times, calls went straight to voicemail  This encounter was created in error - please disregard.

## 2020-10-29 ENCOUNTER — VIRTUAL VISIT (OUTPATIENT)
Dept: FAMILY MEDICINE CLINIC | Age: 65
End: 2020-10-29
Payer: MEDICARE

## 2020-10-29 DIAGNOSIS — F20.9 SCHIZOPHRENIA, UNSPECIFIED TYPE (HCC): ICD-10-CM

## 2020-10-29 DIAGNOSIS — Z12.11 SCREEN FOR COLON CANCER: ICD-10-CM

## 2020-10-29 DIAGNOSIS — M79.7 FIBROMYALGIA: ICD-10-CM

## 2020-10-29 DIAGNOSIS — Z71.89 ADVANCED DIRECTIVES, COUNSELING/DISCUSSION: ICD-10-CM

## 2020-10-29 DIAGNOSIS — F31.81 BIPOLAR 2 DISORDER, MAJOR DEPRESSIVE EPISODE (HCC): ICD-10-CM

## 2020-10-29 DIAGNOSIS — Z00.00 MEDICARE ANNUAL WELLNESS VISIT, SUBSEQUENT: Primary | ICD-10-CM

## 2020-10-29 DIAGNOSIS — E11.9 TYPE 2 DIABETES MELLITUS WITHOUT COMPLICATION, WITHOUT LONG-TERM CURRENT USE OF INSULIN (HCC): ICD-10-CM

## 2020-10-29 DIAGNOSIS — F17.201 TOBACCO ABUSE, IN REMISSION: ICD-10-CM

## 2020-10-29 DIAGNOSIS — M47.817 LUMBOSACRAL SPONDYLOSIS WITHOUT MYELOPATHY: ICD-10-CM

## 2020-10-29 PROCEDURE — G8399 PT W/DXA RESULTS DOCUMENT: HCPCS | Performed by: NURSE PRACTITIONER

## 2020-10-29 PROCEDURE — 1100F PTFALLS ASSESS-DOCD GE2>/YR: CPT | Performed by: NURSE PRACTITIONER

## 2020-10-29 PROCEDURE — 3044F HG A1C LEVEL LT 7.0%: CPT | Performed by: NURSE PRACTITIONER

## 2020-10-29 PROCEDURE — G9717 DOC PT DX DEP/BP F/U NT REQ: HCPCS | Performed by: NURSE PRACTITIONER

## 2020-10-29 PROCEDURE — G8427 DOCREV CUR MEDS BY ELIG CLIN: HCPCS | Performed by: NURSE PRACTITIONER

## 2020-10-29 PROCEDURE — 3288F FALL RISK ASSESSMENT DOCD: CPT | Performed by: NURSE PRACTITIONER

## 2020-10-29 PROCEDURE — G0439 PPPS, SUBSEQ VISIT: HCPCS | Performed by: NURSE PRACTITIONER

## 2020-10-29 PROCEDURE — G8756 NO BP MEASURE DOC: HCPCS | Performed by: NURSE PRACTITIONER

## 2020-10-29 PROCEDURE — 2022F DILAT RTA XM EVC RTNOPTHY: CPT | Performed by: NURSE PRACTITIONER

## 2020-10-29 PROCEDURE — 3017F COLORECTAL CA SCREEN DOC REV: CPT | Performed by: NURSE PRACTITIONER

## 2020-10-29 NOTE — ACP (ADVANCE CARE PLANNING)
Advance Care Planning       Advance Care Planning (ACP) Physician/NP/PA (Provider) Conversation        Date of ACP Conversation: 10/29/2020    Conversation Conducted with:   Patient with Decision Making Kanwal Ma Maker:    Current Designated Health Care Decision Maker:   (If there is a valid [address] named in the [address]" box in the ACP activity, but it is not visible above, be sure to open that field and then select the health care decision maker relationship (ie \"primary\") in the blank space to the right of the name.)    Note: Assess and validate information in current ACP documents, as indicated. Note: If the relationship of these Decision-Makers to the patient does NOT follow your state's Next of Kin hierarchy, recommend that patient complete ACP document that meets state-specific requirements to allow them to act on the patient's behalf when appropriate. Care Preferences:    Hospitalization: \"If your health worsens and it becomes clear that your chance of recovery is unlikely, what would your preference be regarding hospitalization? \"  If the patient would want hospitalization, answer \"yes\". If the patient would prefer comfort-focused treatment without hospitalization, answer \"no\". no      Ventilation: \"If you were in your present state of health and suddenly became very ill and were unable to breathe on your own, what would your preference be about the use of a ventilator (breathing machine) if it was available to you? \"    If patient would desire the use of a ventilator (breathing machine), answer \"yes\", if not answer \"no\":yes    \"If your health worsens and it becomes clear that your chance of recovery is unlikely, what would your preference be about the use of a ventilator (breathing machine) if it was available to you? \"   no      Resuscitation:  \"CPR works best to restart the heart when there is a sudden event, like a heart attack, in someone who is otherwise healthy. Unfortunately, CPR does not typically restart the heart for people who have serious health conditions or who are very sick. \"    \"In the event your heart stopped as a result of an underlying serious health condition, would you want attempts to be made to restart your heart (answer \"yes\" for attempt to resuscitate) or would you prefer a natural death (answer \"no\" for do not attempt to resuscitate)? \"   no    NOTE: If the patient has a valid advance directive AND provides care preference(s) that are inconsistent with that prior directive, advise the patient to consider either: creating a new advance directive that complies with state-specific requirements; or, if that is not possible, orally revoking that prior directive in accordance with state-specific requirements, which must be documented in the EHR.     Conversation Outcomes / Follow-Up Plan:   Recommended completion of Advance Directive      Length of Voluntary ACP Conversation in minutes:  <16 minutes (Non-Billable)      Zehra Leblanc NP

## 2020-10-29 NOTE — PROGRESS NOTES
Chief Complaint   Patient presents with   [facility] Annual Wellness Visit       1. Have you been to the ER, urgent care clinic since your last visit? Hospitalized since your last visit?no    2. Have you seen or consulted any other health care providers outside of the [address] [place] [address] since your last visit? Include any pap smears or colon screening.  NO

## 2020-10-29 NOTE — PROGRESS NOTES
[address] [place] [address]. [facility] Chris Mott is a 71 y.o. female and presents with Annual Wellness Visit       Assessment/Plan:    Diagnoses and all orders for this visit:    1. Medicare annual wellness visit, subsequent  Completed today to include ETOH and depression screening    2. Advanced directives, counseling/discussion  -     ADVANCE CARE PLANNING FIRST 27 MINS  Emergency contacts updated, discussion documented  Strongly encouraged her to complete an Advanced directive, based on her answers she is not interested in a lot of heroic measures    3. Schizophrenia, unspecified type [facility])  Is now a patient of [facility], they confirmed she does not have schizophrenia    4. Bipolar 2 disorder, major depressive episode [facility])  Is now managed by [facility]    5. Type 2 diabetes mellitus without complication, without long-term current use of insulin ([facility]  She wishes to continue with dietary management  Informed her if her A1C gets to 7 she would need to start medication  She is in agreement with this plan of care  Lab Results   Component Value Date/Time    Hemoglobin A1c 6.7 (H) 10/02/2020 10:30 AM     6. Lumbosacral spondylosis without myelopathy  Is being followed by pain management     7. Fibromyalgia  Is being followed by pain management     8. Tobacco abuse, in remission  She has a total of 22 pack year history, quit in 2014    9. Screen for colon cancer  -     REFERRAL FOR COLONOSCOPY  Health maintenance    Note: Hep c screen next visit  Follow-up and Dispositions    · Return in about 3 months (around 1/29/2021) for DM, DM foot, HLD, HTN, hypothyroid, 30 min, office only. Subjective:    Visit today for follow up on her referrals, review labs and annual wellness visit    ROS:     ROS    The problem list was updated as a part of today's visit.   Patient Active Problem List   Diagnosis Code    Glaucoma H40.9    Primary osteoarthritis of left knee M17.12    TMJ dysfunction M26.609    Lumbago M54.5    Bipolar 2 disorder, major depressive episode ([facility]) F31.81    Carpal tunnel syndrome G56.00    Cervicalgia M54.2    Myalgia and myositis, unspecified [id number]    Lumbosacral spondylosis without myelopathy M47.817    Degeneration of lumbar or lumbosacral intervertebral disc M51.37    Migraine without aura, without mention of intractable migraine without mention of status migrainosus G43.009    Encounter for long-term (current) use of other medications Z79.899    Hip bursitis M70.70    Bilateral knee pain M25.561, M25.562    Hypertension I10    Tinnitus H93.19    Hyperlipemia E78.5    Autoimmune disease ([facility] [address] [address] M35.9    Type 2 diabetes mellitus without complication, without long-term current use of insulin ([facility]) E11.9    S/P thyroidectomy Z98.890    Osteoarthritis of right knee M17.11    Obesity, morbid, BMI 40.0-49.9 ([facility]) E66.01    Hypothyroidism E03.9    GERD (gastroesophageal reflux disease) K21.9    Bochdalek hernia Q79.0    Anxiety F41.9    Pulmonary nodule R91.1    Fibromyalgia M79.7    Bilateral cataracts H26.9    Tobacco abuse, in remission F17.201       The PSH, FH were reviewed. SH:  Social History     Tobacco Use    Smoking status: Former Smoker     Packs/day: 0.50     Years: 22.00     Pack years: 11.00     Types: Cigarettes    Smokeless tobacco: Current User    Tobacco comment: Electronic cigarettes   Substance Use Topics    Alcohol use: No    Drug use: No       Medications/Allergies:  Current Outpatient Medications on File Prior to Visit   Medication Sig Dispense Refill    lamoTRIgine (LaMICtal) 200 mg tablet Take 1 Tab by mouth daily. 30 Tab 0    QUEtiapine (SEROquel) 200 mg tablet Take 1 Tab by mouth daily. 30 Tab 0    cholecalciferol, vitamin D3, (Vitamin D3) 50 mcg (2,000 unit) tab Take  by mouth daily.       albuterol (Ventolin HFA) 90 mcg/actuation inhaler INHALE 2 PUFFS BY MOUTH EVERY 4 HOURS AS NEEDED FOR WHEEZE      lisinopriL (PRINIVIL, ZESTRIL) 10 mg tablet Take 1 Tab by mouth daily. 90 Tab 0    naproxen (NAPROSYN) 500 mg tablet Take 1 Tab by mouth two (2) times daily (with meals). 180 Tab 0    hydroCHLOROthiazide (MICROZIDE) 12.5 mg capsule Take 1 Cap by mouth daily. 90 Cap 0    levothyroxine (SYNTHROID) 112 mcg tablet Take 1 Tab by mouth Daily (before breakfast). 90 Tab 0    pantoprazole (PROTONIX) 40 mg tablet Take 1 Tab by mouth daily. 90 Tab 0    atorvastatin (LIPITOR) 40 mg tablet Take 1 Tab by mouth daily. 90 Tab 0    doxepin (SINEQUAN) 50 mg capsule Take  by mouth nightly.  brimonidine-timolol (COMBIGAN) 0.2-0.5 % drop ophthalmic solution 1 Drop every twelve (12) hours.  KRILL OIL PO Take 300 mg by mouth.  VITAMIN B COMPLEX (B-50 COMPLEX PO) Take  by mouth daily.  cycloSPORINE (RESTASIS) 0.05 % ophthalmic emulsion Administer 1 Drop to both eyes two (2) times a day. Indications: DRY EYE      travoprost (TRAVATAN Z) 0.004 % ophthalmic solution Administer 1 Drop to both eyes every evening.  carboxymethylcellulose sodium (REFRESH LIQUIGEL) 1 % Drop Apply  to eye.  methylPREDNISolone (MEDROL DOSEPACK) 4 mg tablet Per dose pack instructions 1 Dose Pack 0    ALPRAZolam (XANAX) 0.5 mg tablet As needed for Anxiety      halobetasol (ULTRAVATE) 0.05 % ointment Apply  to affected area two (2) times a day. use thin layer 50 g 0     No current facility-administered medications on file prior to visit.          Allergies   Allergen Reactions    Aluminum Anaphylaxis    Chromium Anaphylaxis    Cobalt Anaphylaxis    Copper Anaphylaxis    Cromolyn Anaphylaxis    Medical Resources Anaphylaxis     Medical mesh, proplast and juan    Nickel Anaphylaxis    Zinc Anaphylaxis    Adhesive Rash    Benylin Dm Other (comments)     cannot take glaucoma pressure    Flexeril [Cyclobenzaprine] Other (comments)     Metallic taste, dry eyes and mouth, headaches    Gabapentin Other (comments)     Dizziness, clumsiness, confusion       Objective:  Visit Vitals  LMP 03/03/1994    There is no height or weight on file to calculate BMI.     Physical assessment  Physical Exam      Labwork and Ancillary Studies:    CBC w/Diff  Lab Results   Component Value Date/Time    WBC 8.0 10/02/2020 10:30 AM    HGB 11.4 (L) 10/02/2020 10:30 AM    PLATELET 596 87/21/5677 10:30 AM         Basic Metabolic Profile  Lab Results   Component Value Date/Time    Sodium 141 10/02/2020 10:30 AM    Potassium 4.0 10/02/2020 10:30 AM    Chloride 105 10/02/2020 10:30 AM    CO2 30 10/02/2020 10:30 AM    Anion gap 6 10/02/2020 10:30 AM    Glucose 126 (H) 10/02/2020 10:30 AM    BUN 26 (H) 10/02/2020 10:30 AM    Creatinine 0.86 10/02/2020 10:30 AM    BUN/Creatinine ratio 30 (H) 10/02/2020 10:30 AM    GFR est AA >60 10/02/2020 10:30 AM    GFR est non-AA >60 10/02/2020 10:30 AM    Calcium 9.2 10/02/2020 10:30 AM        Cholesterol  Lab Results   Component Value Date/Time    Cholesterol, total 182 10/02/2020 10:30 AM    HDL Cholesterol 48 10/02/2020 10:30 AM    LDL, calculated 108.8 (H) 10/02/2020 10:30 AM    Triglyceride 126 10/02/2020 10:30 AM    CHOL/HDL Ratio 3.8 10/02/2020 10:30 AM       Health Maintenance:   Health Maintenance   Topic Date Due    Foot Exam Q1  11/20/1964    Eye Exam Retinal or Dilated  11/20/1964    Colorectal Cancer Screening Combo  04/11/2018    Shingrix Vaccine Age 50> (2 of 2) 02/13/2019    GLAUCOMA SCREENING Q2Y  11/20/2019    Medicare Yearly Exam  10/02/2020    Breast Cancer Screen Mammogram  07/29/2021 (Originally 11/20/2017)    A1C test (Diabetic or Prediabetic)  10/02/2021    MICROALBUMIN Q1  10/02/2021    Lipid Screen  10/02/2021    DTaP/Tdap/Td series (2 - Td) 10/09/2028    Bone Densitometry (Dexa) Screening  Completed    Flu Vaccine  Completed    Pneumococcal 65+ years  Completed    Hepatitis C Screening  Addressed       I have discussed the diagnosis with the patient and the intended plan as seen in the above orders. The patient has received an After-Visit Summary and questions were answered concerning future plans. An After Visit Summary was printed and given to the patient. All diagnosis have been discussed with the patient and all of the patient's questions have been answered. Follow-up and Dispositions    · Return in about 3 months (around 1/29/2021) for DM, DM foot, HLD, HTN, hypothyroid, 30 min, office only. Lion Echavarria, [facility]  [address] [facility]   [address] [facility] [address] [address]      This is the Subsequent Medicare Annual Wellness Exam, performed 12 months or more after the Initial AWV or the last Subsequent AWV    I have reviewed the patient's medical history in detail and updated the computerized patient record.      History     Patient Active Problem List   Diagnosis Code    Glaucoma H40.9    Primary osteoarthritis of left knee M17.12    TMJ dysfunction M26.609    Lumbago M54.5    Bipolar 2 disorder, major depressive episode ([facility] [address] F31.81    Carpal tunnel syndrome G56.00    Cervicalgia M54.2    Myalgia and myositis, unspecified [id number]    Lumbosacral spondylosis without myelopathy M47.817    Degeneration of lumbar or lumbosacral intervertebral disc M51.37    Migraine without aura, without mention of intractable migraine without mention of status migrainosus G43.009    Encounter for long-term (current) use of other medications Z79.899    Hip bursitis M70.70    Bilateral knee pain M25.561, M25.562    Hypertension I10    Tinnitus H93.19    Hyperlipemia E78.5    Autoimmune disease ([facility] [address] M35.9    Type 2 diabetes mellitus without complication, without long-term current use of insulin (HCC) E11.9    S/P thyroidectomy Z98.890    Osteoarthritis of right knee M17.11    Obesity, morbid, BMI 40.0-49.9 (HCC) E66.01    Hypothyroidism E03.9    GERD (gastroesophageal reflux disease) K21.9    Bochdalek hernia Q79.0    Anxiety F41.9    Pulmonary nodule R91.1    Fibromyalgia M79.7    Bilateral cataracts H26.9    Tobacco abuse, in remission F17.201     Past Medical History:   Diagnosis Date    Allergic rhinitis     Anxiety disorder     Arthritis 2/29/2012    Arthritis of knee     left  greater than right    Autoimmune disease ([address]     Back pain     Bilateral cataracts     Bipolar 1 disorder ([address] 4/12/2013    Bipolar disorder ([address]     Chronic pain     Constipation, chronic     COPD (chronic obstructive pulmonary disease) (HCC)     Depression     Eczema     Fibromyalgia     Gastritis     GERD (gastroesophageal reflux disease)     Glaucoma 2/29/2012    Glaucoma     Hallux valgus     left foot    Hypertension     IBS (irritable bowel syndrome)     Ill-defined condition     Lumbago     Migraine headache     Nervous disorder     Neuritis     Osteoarthritis     Pain in joint, lower leg     Pain in joint, shoulder region     Pelvic relaxation disorder     Personality disorder ([address]     Psychiatric disorder     Bipolar    Psychosis ([address] 2/29/2012    PTSD (post-traumatic stress disorder)     Schizophrenia ([address] 2/29/2012    Scoliosis     Spondylolisthesis     TMJ dysfunction     Trochanteric bursitis     Ulcers of both great toes ([address]     Vertigo       Past Surgical History:   Procedure Laterality Date    HX ABDOMINOPLASTY      HX BREAST REDUCTION Bilateral 1997    HX BUNIONECTOMY      left foot    HX CYSTOCELE REPAIR  1994    HX HAMMER TOE REPAIR      HX KNEE ARTHROSCOPY      HX OTHER SURGICAL      jaw implant    HX TONSILLECTOMY      HX TOTAL VAGINAL HYSTERECTOMY  1994    prolaplsed ut    REPAIR OF RECTOCELE  1994     Current Outpatient Medications   Medication Sig Dispense Refill    lamoTRIgine (LaMICtal) 200 mg tablet Take 1 Tab by mouth daily.  30 Tab 0    QUEtiapine (SEROquel) 200 mg tablet Take 1 Tab by mouth daily. 30 Tab 0    cholecalciferol, vitamin D3, (Vitamin D3) 50 mcg (2,000 unit) tab Take  by mouth daily.  albuterol (Ventolin HFA) 90 mcg/actuation inhaler INHALE 2 PUFFS BY MOUTH EVERY 4 HOURS AS NEEDED FOR WHEEZE      lisinopriL (PRINIVIL, ZESTRIL) 10 mg tablet Take 1 Tab by mouth daily. 90 Tab 0    naproxen (NAPROSYN) 500 mg tablet Take 1 Tab by mouth two (2) times daily (with meals). 180 Tab 0    hydroCHLOROthiazide (MICROZIDE) 12.5 mg capsule Take 1 Cap by mouth daily. 90 Cap 0    levothyroxine (SYNTHROID) 112 mcg tablet Take 1 Tab by mouth Daily (before breakfast). 90 Tab 0    pantoprazole (PROTONIX) 40 mg tablet Take 1 Tab by mouth daily. 90 Tab 0    atorvastatin (LIPITOR) 40 mg tablet Take 1 Tab by mouth daily. 90 Tab 0    doxepin (SINEQUAN) 50 mg capsule Take  by mouth nightly.  brimonidine-timolol (COMBIGAN) 0.2-0.5 % drop ophthalmic solution 1 Drop every twelve (12) hours.  KRILL OIL PO Take 300 mg by mouth.  VITAMIN B COMPLEX (B-50 COMPLEX PO) Take  by mouth daily.  cycloSPORINE (RESTASIS) 0.05 % ophthalmic emulsion Administer 1 Drop to both eyes two (2) times a day. Indications: DRY EYE      travoprost (TRAVATAN Z) 0.004 % ophthalmic solution Administer 1 Drop to both eyes every evening.  carboxymethylcellulose sodium (REFRESH LIQUIGEL) 1 % Drop Apply  to eye.  methylPREDNISolone (MEDROL DOSEPACK) 4 mg tablet Per dose pack instructions 1 Dose Pack 0    ALPRAZolam (XANAX) 0.5 mg tablet As needed for Anxiety      halobetasol (ULTRAVATE) 0.05 % ointment Apply  to affected area two (2) times a day.  use thin layer 50 g 0     Allergies   Allergen Reactions    Aluminum Anaphylaxis    Chromium Anaphylaxis    Cobalt Anaphylaxis    Copper Anaphylaxis    Cromolyn Anaphylaxis    Medical Resources Anaphylaxis     Medical mesh, proplast and juan    Nickel Anaphylaxis    Zinc Anaphylaxis    Adhesive Rash    Benylin Dm Other (comments)     cannot take glaucoma pressure    Flexeril [Cyclobenzaprine] Other (comments)     Metallic taste, dry eyes and mouth, headaches    Gabapentin Other (comments)     Dizziness, clumsiness, confusion       Family History   Problem Relation Age of Onset    Heart Disease Father     Diabetes Sister     Headache Sister     Suicide Brother     Substance Abuse Paternal Uncle     High Cholesterol Sister      Social History     Tobacco Use    Smoking status: Former Smoker     Packs/day: 0.50     Years: 22.00     Pack years: 11.00     Types: Cigarettes    Smokeless tobacco: Current User    Tobacco comment: Electronic cigarettes   Substance Use Topics    Alcohol use: No       Depression Risk Factor Screening:     3 most recent PHQ Screens 10/29/2020   PHQ Not Done -   Little interest or pleasure in doing things Several days   Feeling down, depressed, irritable, or hopeless Several days   Total Score PHQ 2 2   Trouble falling or staying asleep, or sleeping too much -   Feeling tired or having little energy -   Poor appetite, weight loss, or overeating -   Feeling bad about yourself - or that you are a failure or have let yourself or your family down -   Trouble concentrating on things such as school, work, reading, or watching TV -   Moving or speaking so slowly that other people could have noticed; or the opposite being so fidgety that others notice -   Thoughts of being better off dead, or hurting yourself in some way -   How difficult have these problems made it for you to do your work, take care of your home and get along with others -       Alcohol Risk Screen   Do you average more than 1 drink per night or more than 7 drinks a week:  No    On any one occasion in the past three months have you have had more than 3 drinks containing alcohol:  No        Functional Ability and Level of Safety:   Hearing: Hearing is good. Activities of Daily Living: The home contains: no safety equipment.   Patient does total self care Ambulation: with difficulty, uses a cane     Fall Risk:  Fall Risk Assessment, last 12 mths 10/29/2020   Able to walk? Yes   Fall in past 12 months? Yes   Fall with injury? -   Number of falls in past 12 months 4   Fall Risk Score -     Abuse Screen:  Patient is not abused       Cognitive Screening   Has your family/caregiver stated any concerns about your memory: no    Cognitive Screening: Normal - exhibited through interview great memory    Patient Care Team   Patient Care Team:  Danae Cassidy NP as PCP - General (Nurse Practitioner)  Danae Cassidy NP as PCP - [address] [place] [address]  [facility] Provider  Jose Francisco Vogt MD (Orthopedic Surgery)  Jimena Beard MD (Unknown Physician Specialty)  Clinton Daniels MD (Psychiatry)  Jack Castro MD (Inactive) (Ophthalmology)  Oanh Guzman MD (Psychiatry)  Jeanne Braxton MD (Anesthesiology)    Assessment/Plan   Education and counseling provided:  Are appropriate based on today's review and evaluation  End-of-Life planning (with patient's consent)    Diagnoses and all orders for this visit:    1. Medicare annual wellness visit, subsequent    2. Advanced directives, counseling/discussion  -     ADVANCE CARE PLANNING FIRST 30 MINS    3. Schizophrenia, unspecified type ([facility]    4. Bipolar 2 disorder, major depressive episode ([facility]    5. Type 2 diabetes mellitus without complication, without long-term current use of insulin (HCC)    6. Lumbosacral spondylosis without myelopathy    7. Fibromyalgia    8. Tobacco abuse, in remission    9.  Screen for colon cancer  -     REFERRAL FOR COLONOSCOPY        Health Maintenance Due   Topic Date Due    Foot Exam Q1  11/20/1964    Eye Exam Retinal or Dilated  11/20/1964    Colorectal Cancer Screening Combo  04/11/2018    Shingrix Vaccine Age 50> (2 of 2) 02/13/2019    GLAUCOMA SCREENING Q2Y  11/20/2019    Medicare Yearly Exam  10/02/2020       Mary Grace Clement, who was evaluated through a synchronous (real-time) audio-video encounter, and/or her healthcare decision maker, is aware that it is a billable service, with coverage as determined by her insurance carrier. She provided verbal consent to proceed: Yes, and patient identification was verified. It was conducted pursuant to the emergency declaration under the [address] [place] [address] and the [employer] Act. A caregiver was present when appropriate. Ability to conduct physical exam was limited. I was in the office. The patient was in the office.     Misa Booker NP

## 2020-10-29 NOTE — PATIENT INSTRUCTIONS
Medicare Wellness Visit, Female The best way to live healthy is to have a lifestyle where you eat a well-balanced diet, exercise regularly, limit alcohol use, and quit all forms of tobacco/nicotine, if applicable. Regular preventive services are another way to keep healthy. Preventive services (vaccines, screening tests, monitoring & exams) can help personalize your care plan, which helps you manage your own care. Screening tests can find health problems at the earliest stages, when they are easiest to treat. [facility] follows the current, evidence-based guidelines published by the [facility] ([facility]) when recommending preventive services for our patients. Because we follow these guidelines, sometimes recommendations change over time as research supports it. (For example, mammograms used to be recommended annually. Even though Medicare will still pay for an annual mammogram, the newer guidelines recommend a mammogram every two years for women of average risk). Of course, you and your doctor may decide to screen more often for some diseases, based on your risk and your co-morbidities (chronic disease you are already diagnosed with). Preventive services for you include: - Medicare offers their members a free annual wellness visit, which is time for you and your primary care provider to discuss and plan for your preventive service needs. Take advantage of this benefit every year! 
-All adults over the age of 72 should receive the recommended pneumonia vaccines. Current USPSTF guidelines recommend a series of two vaccines for the best pneumonia protection.  
-All adults should have a flu vaccine yearly and a tetanus vaccine every 10 years.  
-All adults age 48 and older should receive the shingles vaccines (series of two vaccines). -All adults age 38-68 who are overweight should have a diabetes screening test once every three years. -All adults born between [birth year] and 1966 should be screened once for Hepatitis C. 
-Other screening tests and preventive services for persons with diabetes include: an eye exam to screen for diabetic retinopathy, a kidney function test, a foot exam, and stricter control over your cholesterol.  
-Cardiovascular screening for adults with routine risk involves an electrocardiogram (ECG) at intervals determined by your doctor.  
-Colorectal cancer screenings should be done for adults age 54-65 with no increased risk factors for colorectal cancer. There are a number of acceptable methods of screening for this type of cancer. Each test has its own benefits and drawbacks. Discuss with your doctor what is most appropriate for you during your annual wellness visit. The different tests include: colonoscopy (considered the best screening method), a fecal occult blood test, a fecal DNA test, and sigmoidoscopy. 
 
-A bone mass density test is recommended when a woman turns 65 to screen for osteoporosis. This test is only recommended one time, as a screening. Some providers will use this same test as a disease monitoring tool if you already have osteoporosis. -Breast cancer screenings are recommended every other year for women of normal risk, age 54-69. 
-Cervical cancer screenings for women over age 72 are only recommended with certain risk factors. Here is a list of your current Health Maintenance items (your personalized list of preventive services) with a due date: 
Health Maintenance Due Topic Date Due  
 Diabetic Foot Care  11/20/1964  Eye Exam  11/20/1964  Colorectal Screening  04/11/2018  Shingles Vaccine (2 of 2) 02/13/2019  Glaucoma Screening   11/20/2019 [address] [place] [address] Annual Well Visit  10/02/2020

## 2020-11-15 DIAGNOSIS — M47.817 LUMBOSACRAL SPONDYLOSIS WITHOUT MYELOPATHY: ICD-10-CM

## 2020-11-15 DIAGNOSIS — M79.7 FIBROMYALGIA: ICD-10-CM

## 2020-11-15 DIAGNOSIS — M51.36 DDD (DEGENERATIVE DISC DISEASE), LUMBAR: ICD-10-CM

## 2020-11-15 DIAGNOSIS — M54.50 LOW BACK PAIN AT MULTIPLE SITES: ICD-10-CM

## 2021-02-23 ENCOUNTER — VIRTUAL VISIT (OUTPATIENT)
Dept: FAMILY MEDICINE CLINIC | Age: 66
End: 2021-02-23
Payer: MEDICARE

## 2021-02-23 DIAGNOSIS — E78.2 MIXED HYPERLIPIDEMIA: ICD-10-CM

## 2021-02-23 DIAGNOSIS — H93.13 TINNITUS OF BOTH EARS: Primary | ICD-10-CM

## 2021-02-23 DIAGNOSIS — K21.9 GASTROESOPHAGEAL REFLUX DISEASE: ICD-10-CM

## 2021-02-23 DIAGNOSIS — G89.29 CHRONIC EAR PAIN, BILATERAL: ICD-10-CM

## 2021-02-23 DIAGNOSIS — M35.9 AUTOIMMUNE DISEASE (HCC): ICD-10-CM

## 2021-02-23 DIAGNOSIS — J44.9 CHRONIC OBSTRUCTIVE PULMONARY DISEASE, UNSPECIFIED COPD TYPE (HCC): ICD-10-CM

## 2021-02-23 DIAGNOSIS — M79.7 FIBROMYALGIA: ICD-10-CM

## 2021-02-23 DIAGNOSIS — F31.81 BIPOLAR 2 DISORDER, MAJOR DEPRESSIVE EPISODE (HCC): ICD-10-CM

## 2021-02-23 DIAGNOSIS — H92.03 CHRONIC EAR PAIN, BILATERAL: ICD-10-CM

## 2021-02-23 DIAGNOSIS — E89.0 POSTOPERATIVE HYPOTHYROIDISM: ICD-10-CM

## 2021-02-23 DIAGNOSIS — E11.9 TYPE 2 DIABETES MELLITUS WITHOUT COMPLICATION, WITHOUT LONG-TERM CURRENT USE OF INSULIN (HCC): ICD-10-CM

## 2021-02-23 PROCEDURE — G8399 PT W/DXA RESULTS DOCUMENT: HCPCS | Performed by: FAMILY MEDICINE

## 2021-02-23 PROCEDURE — 1100F PTFALLS ASSESS-DOCD GE2>/YR: CPT | Performed by: FAMILY MEDICINE

## 2021-02-23 PROCEDURE — 99214 OFFICE O/P EST MOD 30 MIN: CPT | Performed by: FAMILY MEDICINE

## 2021-02-23 PROCEDURE — 3046F HEMOGLOBIN A1C LEVEL >9.0%: CPT | Performed by: FAMILY MEDICINE

## 2021-02-23 PROCEDURE — 2022F DILAT RTA XM EVC RTNOPTHY: CPT | Performed by: FAMILY MEDICINE

## 2021-02-23 PROCEDURE — 3288F FALL RISK ASSESSMENT DOCD: CPT | Performed by: FAMILY MEDICINE

## 2021-02-23 PROCEDURE — G8428 CUR MEDS NOT DOCUMENT: HCPCS | Performed by: FAMILY MEDICINE

## 2021-02-23 PROCEDURE — 1090F PRES/ABSN URINE INCON ASSESS: CPT | Performed by: FAMILY MEDICINE

## 2021-02-23 PROCEDURE — G9717 DOC PT DX DEP/BP F/U NT REQ: HCPCS | Performed by: FAMILY MEDICINE

## 2021-02-23 PROCEDURE — G8756 NO BP MEASURE DOC: HCPCS | Performed by: FAMILY MEDICINE

## 2021-02-23 PROCEDURE — 3017F COLORECTAL CA SCREEN DOC REV: CPT | Performed by: FAMILY MEDICINE

## 2021-02-23 RX ORDER — PANTOPRAZOLE SODIUM 40 MG/1
TABLET, DELAYED RELEASE ORAL
Qty: 90 TAB | Refills: 3 | Status: SHIPPED | OUTPATIENT
Start: 2021-02-23 | End: 2022-04-01 | Stop reason: SDUPTHER

## 2021-02-23 RX ORDER — ATORVASTATIN CALCIUM 40 MG/1
TABLET, FILM COATED ORAL
Qty: 90 TAB | Refills: 3 | Status: SHIPPED | OUTPATIENT
Start: 2021-02-23 | End: 2021-02-23 | Stop reason: SDUPTHER

## 2021-02-23 RX ORDER — PANTOPRAZOLE SODIUM 40 MG/1
TABLET, DELAYED RELEASE ORAL
Qty: 90 TAB | Refills: 3 | Status: SHIPPED | OUTPATIENT
Start: 2021-02-23 | End: 2021-02-23 | Stop reason: SDUPTHER

## 2021-02-23 RX ORDER — NAPROXEN 500 MG/1
TABLET ORAL
Qty: 180 TAB | Refills: 3 | Status: SHIPPED | OUTPATIENT
Start: 2021-02-23 | End: 2022-05-17

## 2021-02-23 RX ORDER — ATORVASTATIN CALCIUM 40 MG/1
TABLET, FILM COATED ORAL
Qty: 90 TAB | Refills: 3 | Status: SHIPPED | OUTPATIENT
Start: 2021-02-23 | End: 2022-03-30

## 2021-02-23 RX ORDER — NAPROXEN 500 MG/1
TABLET ORAL
Qty: 180 TAB | Refills: 3 | Status: SHIPPED | OUTPATIENT
Start: 2021-02-23 | End: 2021-02-23 | Stop reason: SDUPTHER

## 2021-02-23 NOTE — PROGRESS NOTES
Guillermina Lang is a 76 y.o.  female and presents with    Chief Complaint   Patient presents with    New Patient   Yolanda Clinton, who was evaluated through a synchronous (real-time) audio-video encounter, and/or her healthcare decision maker, is aware that it is a billable service, with coverage as determined by her insurance carrier. She provided verbal consent to proceed: Yes, and patient identification was verified. It was conducted pursuant to the emergency declaration under the [facility], [address] [place] [address] authority and the [employer] Act. A caregiver was present when appropriate. Ability to conduct physical exam was limited. I was in the office. The patient was at home. Subjective:  Pt is grieving her sister's death. Her sister's home burned down. She has been to her therapist, Hermilo Chapman    She sees dr. Deepti Walker for psychiatry  Cardiovascular Review:  The patient has diabetes, hyperlipidemia and obesity. Diet and Lifestyle: not attempting to follow a low fat, low cholesterol diet, not attempting to follow a low sodium diet, does not rigorously follow a diabetic diet, sedentary, nonsmoker  Home BP Monitoring: is not measured at home. Pertinent ROS: taking medications as instructed, no medication side effects noted, no TIA's, no chest pain on exertion, no dyspnea on exertion, no swelling of ankles. Depression Review:  Patient is seen for followup of depression. Treatment includes lamotrigine, quetiapine, lorazepam and individual therapy. Ongoing symptoms include depressed mood, weight gain, insomnia, fatigue, feelings of worthlessness/guilt and difficulty concentrating. She denies recurrent thoughts of death. She experiences the following side effects from the treatment: none   She has chronic pain and is followed by orthopedic surgeon at this time.       She has had 6 bilateral TMJ surgeries    She has hypothyroid. ROS   All other systems reviewed and are negative. Objective: There were no vitals filed for this visit. alert, well appearing, and in no distress, oriented to person, place, and time and obese  Mental status - anxious  Chest - normal work of breathing  Neurological - cranial nerves II through XII intact    LABS   TSH 19  TESTS      Assessment/Plan:    1. Autoimmune disease ([facility]  F/u with rheumatology; continue pain management    2. Chronic obstructive pulmonary disease, unspecified COPD type ([facility]  Continue inhaled therapy; f/u with pulmonologist    3. Body mass index (BMI)40.0-44.9, adult ([facility]  I have reviewed/discussed the above normal BMI with the patient. I have recommended the following interventions: dietary management education, guidance, and counseling, encourage exercise and monitor weight . .        4. Type 2 diabetes mellitus without complication, without long-term current use of insulin (HCC)  Goal hgb a1c <7; encourage low carb diet    5. Gastroesophageal reflux disease  Continue PPI  - pantoprazole (PROTONIX) 40 mg tablet; TAKE 1 TABLET BY MOUTH  DAILY  Dispense: 90 Tab; Refill: 3    6. Mixed hyperlipidemia  Continue statin therapy  - atorvastatin (LIPITOR) 40 mg tablet; TAKE 1 TABLET BY MOUTH  DAILY  Dispense: 90 Tab; Refill: 3    7. Tinnitus of both ears  Refer to ENT    8. Chronic ear pain, bilateral      9. Bipolar 2 disorder, major depressive episode ([facility]  F/u with psychiatrist; continue current treatment    10. Fibromyalgia  Pain management; encourage stretching and strengthening exercises  - naproxen (NAPROSYN) 500 mg tablet; TAKE 1 TABLET BY MOUTH  TWICE DAILY WITH MEALS  Dispense: 180 Tab; Refill: 3    11. Postoperative hypothyroidism  Pt needs repeat TSH to evaluate for therapeutic dose of levothyroxine  - TSH 3RD GENERATION;  Future      Lab review: orders written for new lab studies as appropriate; see orders      I have discussed the diagnosis with the patient and the intended plan as seen in the above orders. I have discussed medication side effects and warnings with the patient as well. I have reviewed the plan of care with the patient, accepted their input and they are in agreement with the treatment goals.

## 2021-03-05 ENCOUNTER — APPOINTMENT (OUTPATIENT)
Dept: FAMILY MEDICINE CLINIC | Age: 66
End: 2021-03-05

## 2021-03-05 ENCOUNTER — HOSPITAL ENCOUNTER (OUTPATIENT)
Dept: LAB | Age: 66
Discharge: HOME | End: 2021-03-05
Payer: MEDICARE

## 2021-03-05 DIAGNOSIS — E89.0 POSTOPERATIVE HYPOTHYROIDISM: ICD-10-CM

## 2021-03-05 LAB — TSH SERPL DL<=0.05 MIU/L-ACNC: 0.38 UIU/ML (ref 0.36–3.74)

## 2021-03-05 PROCEDURE — 36415 COLL VENOUS BLD VENIPUNCTURE: CPT

## 2021-03-05 PROCEDURE — 84443 ASSAY THYROID STIM HORMONE: CPT

## 2021-03-15 DIAGNOSIS — I10 ESSENTIAL HYPERTENSION: ICD-10-CM

## 2021-03-15 DIAGNOSIS — E89.0 POSTOPERATIVE HYPOTHYROIDISM: ICD-10-CM

## 2021-03-15 RX ORDER — LEVOTHYROXINE SODIUM 112 UG/1
TABLET ORAL
Qty: 90 TAB | Refills: 1 | Status: CANCELLED | OUTPATIENT
Start: 2021-03-15

## 2021-03-16 DIAGNOSIS — E89.0 POSTOPERATIVE HYPOTHYROIDISM: ICD-10-CM

## 2021-03-16 RX ORDER — LEVOTHYROXINE SODIUM 100 UG/1
100 TABLET ORAL
Qty: 90 TAB | Refills: 1 | Status: SHIPPED | OUTPATIENT
Start: 2021-03-16 | End: 2021-09-17

## 2021-03-17 RX ORDER — HYDROCHLOROTHIAZIDE 12.5 MG/1
CAPSULE ORAL
Qty: 90 CAP | Refills: 3 | Status: SHIPPED | OUTPATIENT
Start: 2021-03-17 | End: 2022-04-20 | Stop reason: SDUPTHER

## 2021-03-17 RX ORDER — LISINOPRIL 10 MG/1
TABLET ORAL
Qty: 90 TAB | Refills: 3 | Status: SHIPPED | OUTPATIENT
Start: 2021-03-17 | End: 2022-02-28

## 2021-05-04 DIAGNOSIS — F31.81 BIPOLAR 2 DISORDER, MAJOR DEPRESSIVE EPISODE (HCC): Primary | ICD-10-CM

## 2021-05-18 ENCOUNTER — OFFICE VISIT (OUTPATIENT)
Dept: FAMILY MEDICINE CLINIC | Age: 66
End: 2021-05-18
Payer: MEDICARE

## 2021-05-18 VITALS
HEIGHT: 64 IN | HEART RATE: 89 BPM | BODY MASS INDEX: 44.05 KG/M2 | SYSTOLIC BLOOD PRESSURE: 126 MMHG | RESPIRATION RATE: 16 BRPM | OXYGEN SATURATION: 97 % | DIASTOLIC BLOOD PRESSURE: 82 MMHG | WEIGHT: 258 LBS

## 2021-05-18 DIAGNOSIS — E89.0 POSTOPERATIVE HYPOTHYROIDISM: ICD-10-CM

## 2021-05-18 DIAGNOSIS — M17.12 PRIMARY OSTEOARTHRITIS OF LEFT KNEE: ICD-10-CM

## 2021-05-18 DIAGNOSIS — E11.9 TYPE 2 DIABETES MELLITUS WITHOUT COMPLICATION, WITHOUT LONG-TERM CURRENT USE OF INSULIN (HCC): ICD-10-CM

## 2021-05-18 DIAGNOSIS — G56.02 LEFT CARPAL TUNNEL SYNDROME: ICD-10-CM

## 2021-05-18 DIAGNOSIS — I10 ESSENTIAL HYPERTENSION: ICD-10-CM

## 2021-05-18 DIAGNOSIS — D75.9 BONE MARROW DISORDER: ICD-10-CM

## 2021-05-18 DIAGNOSIS — M26.653 ARTHROPATHY OF BOTH TEMPOROMANDIBULAR JOINTS: Primary | ICD-10-CM

## 2021-05-18 DIAGNOSIS — M18.12 PRIMARY OSTEOARTHRITIS OF FIRST CARPOMETACARPAL JOINT OF LEFT HAND: ICD-10-CM

## 2021-05-18 PROCEDURE — G8754 DIAS BP LESS 90: HCPCS | Performed by: FAMILY MEDICINE

## 2021-05-18 PROCEDURE — G8752 SYS BP LESS 140: HCPCS | Performed by: FAMILY MEDICINE

## 2021-05-18 PROCEDURE — G8427 DOCREV CUR MEDS BY ELIG CLIN: HCPCS | Performed by: FAMILY MEDICINE

## 2021-05-18 PROCEDURE — 2022F DILAT RTA XM EVC RTNOPTHY: CPT | Performed by: FAMILY MEDICINE

## 2021-05-18 PROCEDURE — 3046F HEMOGLOBIN A1C LEVEL >9.0%: CPT | Performed by: FAMILY MEDICINE

## 2021-05-18 PROCEDURE — 99214 OFFICE O/P EST MOD 30 MIN: CPT | Performed by: FAMILY MEDICINE

## 2021-05-18 PROCEDURE — 3288F FALL RISK ASSESSMENT DOCD: CPT | Performed by: FAMILY MEDICINE

## 2021-05-18 PROCEDURE — G9717 DOC PT DX DEP/BP F/U NT REQ: HCPCS | Performed by: FAMILY MEDICINE

## 2021-05-18 PROCEDURE — G8536 NO DOC ELDER MAL SCRN: HCPCS | Performed by: FAMILY MEDICINE

## 2021-05-18 PROCEDURE — 1090F PRES/ABSN URINE INCON ASSESS: CPT | Performed by: FAMILY MEDICINE

## 2021-05-18 PROCEDURE — G8399 PT W/DXA RESULTS DOCUMENT: HCPCS | Performed by: FAMILY MEDICINE

## 2021-05-18 PROCEDURE — G8417 CALC BMI ABV UP PARAM F/U: HCPCS | Performed by: FAMILY MEDICINE

## 2021-05-18 PROCEDURE — 3017F COLORECTAL CA SCREEN DOC REV: CPT | Performed by: FAMILY MEDICINE

## 2021-05-18 PROCEDURE — 1100F PTFALLS ASSESS-DOCD GE2>/YR: CPT | Performed by: FAMILY MEDICINE

## 2021-05-18 NOTE — PROGRESS NOTES
Jaspreet Philip is a 76 y.o.  female and presents with    Chief Complaint   Patient presents with    Labs     pt is requesting lab work for [facility].  TMJ     Dr. Carlton Sanchez in [place], pt requesting a referral. Ms. Madan Babcock [phone] point of contact      Subjective:  She has left knee joint osteoarthritis and has been instructed to lose weight for surgery. She was referred to Dr. Aristeo Grey for left carpal tunnel syndrome; she has left [facility] arthritis. She has had 6 TMJ reconstructive surgeries. She has current hardware for 20 years; she needs revision. She has been evaluated by ENT. She was referred to Dr. Izabela Ndiaye for cervical spine and she had an MRI which showed bone marrow abnormality. Cardiovascular Review:  The patient has diabetes, hyperlipidemia and obesity. Diet and Lifestyle: not attempting to follow a low fat, low cholesterol diet, not attempting to follow a low sodium diet, does not rigorously follow a diabetic diet, sedentary, nonsmoker  Home BP Monitoring: is not measured at home. Pertinent ROS: taking medications as instructed, no medication side effects noted, no TIA's, no chest pain on exertion, no dyspnea on exertion, no swelling of ankles. Depression Review:  Patient is seen for followup of depression. Treatment includes lamotrigine, quetiapine, lorazepam and individual therapy. Ongoing symptoms include depressed mood, weight gain, insomnia, fatigue, feelings of worthlessness/guilt and difficulty concentrating. She denies recurrent thoughts of death.    She experiences the following side effects from the treatment: none   She has chronic pain and is followed by orthopedic surgeon at this time.       ROS   General ROS: negative for - chills or fever  Psychological ROS: positive for - anxiety and sleep disturbances  Ophthalmic ROS: positive for - uses glasses  Endocrine ROS: negative for - polydipsia/polyuria or unexpected weight changes  Respiratory ROS: no cough, shortness of breath, or wheezing  Cardiovascular ROS: no chest pain or dyspnea on exertion  Gastrointestinal ROS: no abdominal pain, change in bowel habits, or black or bloody stools  Genito-Urinary ROS: no dysuria, trouble voiding, or hematuria  Neurological ROS: negative for - memory loss  Dermatological ROS: negative for - rash or skin lesion changes    All other systems reviewed and are negative. Objective:  Vitals:    05/18/21 1427   BP: 126/82   Pulse: 89   Resp: 16   SpO2: 97%   Weight: 258 lb (117 kg)   Height: 5' 4\" (1.626 m)   LMP: 03/03/1994       alert, well appearing, and in no distress, oriented to person, place, and time and morbidly obese  Mental status - normal mood, behavior, speech, dress, motor activity, and thought processes  Chest - clear to auscultation, no wheezes, rales or rhonchi, symmetric air entry  Heart - normal rate, regular rhythm, normal S1, S2, no murmurs, rubs, clicks or gallops  Musculoskeletal - abnormal exam of right foot with 2nd hammer toe, abnormal exam of left thumb, wrist, knee  Skin - normal coloration and turgor, no rashes, no suspicious skin lesions noted  Diabetic foot exam:     Left Foot:   Visual Exam: normal    Pulse DP: 2+ (normal)   Filament test: normal sensation       Right Foot:   Visual Exam: normal    Pulse DP: 2+ (normal)   Filament test: normal sensation       LABS   hgb a1c 6.7  TESTS      Assessment/Plan:    1. Arthropathy of both temporomandibular joints  Pt needs evaluation and possible modification  - REFERRAL TO ORAL MAXILLOFACIAL SURGERY    2. Bone marrow disorder  Noted on MRI; assess for cell abnormalities  - CBC WITH AUTOMATED DIFF; Future    3. Postoperative hypothyroidism  Continue therapeutic dosing    4. Essential hypertension  Goal <130/80     5. Primary osteoarthritis of first carpometacarpal joint of left hand  F/u with dr. Henson Hence    6. Left carpal tunnel syndrome  Continue brace and f/u with dr. Henson Hence    7.  Primary osteoarthritis of left knee  F/u with dr. Codie Macias    8. DM 2; controlled with current treatment    Lab review: orders written for new lab studies as appropriate; see orders      I have discussed the diagnosis with the patient and the intended plan as seen in the above orders. The patient has received an after-visit summary and questions were answered concerning future plans. I have discussed medication side effects and warnings with the patient as well. I have reviewed the plan of care with the patient, accepted their input and they are in agreement with the treatment goals.

## 2021-05-18 NOTE — PROGRESS NOTES
Spencer Savage presents today for   Chief Complaint   Patient presents with   [place] Marito     pt is requesting lab work for [facility].  TMJ     Dr. Maureen Rich in [place], pt requesting a referral. Ms. Alvares Heal [phone] point of contact        Is someone accompanying this pt? no    Is the patient using any DME equipment during [address]? no    Depression Screening:  3 most recent PHQ Screens 5/18/2021   PHQ Not Done Active Diagnosis of Depression or Bipolar Disorder   Little interest or pleasure in doing things -   Feeling down, depressed, irritable, or hopeless -   Total Score PHQ 2 -   Trouble falling or staying asleep, or sleeping too much -   Feeling tired or having little energy -   Poor appetite, weight loss, or overeating -   Feeling bad about yourself - or that you are a failure or have let yourself or your family down -   Trouble concentrating on things such as school, work, reading, or watching TV -   Moving or speaking so slowly that other people could have noticed; or the opposite being so fidgety that others notice -   Thoughts of being better off dead, or hurting yourself in some way -   How difficult have these problems made it for you to do your work, take care of your home and get along with others -       Learning Assessment:  Learning Assessment 9/17/2020   PRIMARY LEARNER Patient   HIGHEST LEVEL OF EDUCATION - PRIMARY LEARNER  GRADUATED HIGH SCHOOL OR GED   BARRIERS PRIMARY LEARNER NONE   CO-LEARNER CAREGIVER No   PRIMARY LANGUAGE ENGLISH   LEARNER PREFERENCE PRIMARY LISTENING     -     -   ANSWERED BY Maegan Covert   RELATIONSHIP SELF       Abuse Screening:  Abuse Screening Questionnaire 10/29/2020   Do you ever feel afraid of your partner? N   Are you in a relationship with someone who physically or mentally threatens you? N   Is it safe for you to go home? Y       Fall Risk  Fall Risk Assessment, last 12 mths 2/23/2021   Able to walk?  Yes   Fall in past 12 months? 0   Do you feel unsteady? 0   Are you worried about falling 0   Number of falls in past 12 months -   Fall with injury? -       Health Maintenance reviewed and discussed and ordered per Provider. Health Maintenance Due   Topic Date Due    Foot Exam Q1  Never done    Eye Exam Retinal or Dilated  Never done    Shingrix Vaccine Age 50> (2 of 2) 02/13/2019   . Coordination of Care:  1. Have you been to the ER, urgent care clinic since your last visit? Hospitalized since your last visit? Yes, Urgent Care    2. Have you seen or consulted any other health care providers outside of the [address] [place] [address] since your last visit? Include any pap smears or colon screening.  no      Last  Checked no  Last UDS Checked no  Last Pain contract signed: no

## 2021-05-24 ENCOUNTER — HOSPITAL ENCOUNTER (OUTPATIENT)
Dept: LAB | Age: 66
Discharge: HOME | End: 2021-05-24
Payer: MEDICARE

## 2021-05-24 DIAGNOSIS — E89.0 POSTOPERATIVE HYPOTHYROIDISM: ICD-10-CM

## 2021-05-24 DIAGNOSIS — D75.9 BONE MARROW DISORDER: ICD-10-CM

## 2021-05-24 DIAGNOSIS — E11.9 TYPE 2 DIABETES MELLITUS WITHOUT COMPLICATION, WITHOUT LONG-TERM CURRENT USE OF INSULIN (HCC): ICD-10-CM

## 2021-05-24 LAB
BASOPHILS # BLD: 0.1 K/UL (ref 0–0.1)
BASOPHILS NFR BLD: 1 % (ref 0–2)
DIFFERENTIAL METHOD BLD: ABNORMAL
EOSINOPHIL # BLD: 0.3 K/UL (ref 0–0.4)
EOSINOPHIL NFR BLD: 3 % (ref 0–5)
ERYTHROCYTE [DISTWIDTH] IN BLOOD BY AUTOMATED COUNT: 16.1 % (ref 11.6–14.5)
EST. AVERAGE GLUCOSE BLD GHB EST-MCNC: 157 MG/DL
HBA1C MFR BLD: 7.1 % (ref 4.2–5.6)
HCT VFR BLD AUTO: 37.5 % (ref 35–45)
HGB BLD-MCNC: 11.4 G/DL (ref 12–16)
LYMPHOCYTES # BLD: 2.7 K/UL (ref 0.9–3.6)
LYMPHOCYTES NFR BLD: 28 % (ref 21–52)
MCH RBC QN AUTO: 26.8 PG (ref 24–34)
MCHC RBC AUTO-ENTMCNC: 30.4 G/DL (ref 31–37)
MCV RBC AUTO: 88.2 FL (ref 74–97)
MONOCYTES # BLD: 0.6 K/UL (ref 0.05–1.2)
MONOCYTES NFR BLD: 6 % (ref 3–10)
NEUTS SEG # BLD: 6 K/UL (ref 1.8–8)
NEUTS SEG NFR BLD: 62 % (ref 40–73)
PLATELET # BLD AUTO: 353 K/UL (ref 135–420)
PMV BLD AUTO: 10.7 FL (ref 9.2–11.8)
RBC # BLD AUTO: 4.25 M/UL (ref 4.2–5.3)
TSH SERPL DL<=0.05 MIU/L-ACNC: 1 UIU/ML (ref 0.36–3.74)
WBC # BLD AUTO: 9.7 K/UL (ref 4.6–13.2)

## 2021-05-24 PROCEDURE — 83036 HEMOGLOBIN GLYCOSYLATED A1C: CPT

## 2021-05-24 PROCEDURE — 36415 COLL VENOUS BLD VENIPUNCTURE: CPT

## 2021-05-24 PROCEDURE — 84443 ASSAY THYROID STIM HORMONE: CPT

## 2021-05-24 PROCEDURE — 85025 COMPLETE CBC W/AUTO DIFF WBC: CPT

## 2021-06-29 ENCOUNTER — OFFICE VISIT (OUTPATIENT)
Dept: FAMILY MEDICINE CLINIC | Age: 66
End: 2021-06-29
Payer: MEDICARE

## 2021-06-29 VITALS
OXYGEN SATURATION: 99 % | HEART RATE: 82 BPM | SYSTOLIC BLOOD PRESSURE: 122 MMHG | BODY MASS INDEX: 43.87 KG/M2 | WEIGHT: 257 LBS | TEMPERATURE: 97.9 F | DIASTOLIC BLOOD PRESSURE: 88 MMHG | HEIGHT: 64 IN | RESPIRATION RATE: 16 BRPM

## 2021-06-29 DIAGNOSIS — I10 ESSENTIAL HYPERTENSION: ICD-10-CM

## 2021-06-29 DIAGNOSIS — H40.20X3 ANGLE-CLOSURE GLAUCOMA, SEVERE STAGE: ICD-10-CM

## 2021-06-29 DIAGNOSIS — E89.0 POSTOPERATIVE HYPOTHYROIDISM: ICD-10-CM

## 2021-06-29 DIAGNOSIS — M18.12 PRIMARY OSTEOARTHRITIS OF FIRST CARPOMETACARPAL JOINT OF LEFT HAND: Primary | ICD-10-CM

## 2021-06-29 DIAGNOSIS — E11.9 TYPE 2 DIABETES MELLITUS WITHOUT COMPLICATION, WITHOUT LONG-TERM CURRENT USE OF INSULIN (HCC): ICD-10-CM

## 2021-06-29 PROCEDURE — 99214 OFFICE O/P EST MOD 30 MIN: CPT | Performed by: FAMILY MEDICINE

## 2021-06-29 PROCEDURE — 3051F HG A1C>EQUAL 7.0%<8.0%: CPT | Performed by: FAMILY MEDICINE

## 2021-06-29 PROCEDURE — G8427 DOCREV CUR MEDS BY ELIG CLIN: HCPCS | Performed by: FAMILY MEDICINE

## 2021-06-29 PROCEDURE — 1100F PTFALLS ASSESS-DOCD GE2>/YR: CPT | Performed by: FAMILY MEDICINE

## 2021-06-29 PROCEDURE — 1090F PRES/ABSN URINE INCON ASSESS: CPT | Performed by: FAMILY MEDICINE

## 2021-06-29 PROCEDURE — G9717 DOC PT DX DEP/BP F/U NT REQ: HCPCS | Performed by: FAMILY MEDICINE

## 2021-06-29 PROCEDURE — 2022F DILAT RTA XM EVC RTNOPTHY: CPT | Performed by: FAMILY MEDICINE

## 2021-06-29 PROCEDURE — G8417 CALC BMI ABV UP PARAM F/U: HCPCS | Performed by: FAMILY MEDICINE

## 2021-06-29 PROCEDURE — 3017F COLORECTAL CA SCREEN DOC REV: CPT | Performed by: FAMILY MEDICINE

## 2021-06-29 PROCEDURE — G8752 SYS BP LESS 140: HCPCS | Performed by: FAMILY MEDICINE

## 2021-06-29 PROCEDURE — G8536 NO DOC ELDER MAL SCRN: HCPCS | Performed by: FAMILY MEDICINE

## 2021-06-29 PROCEDURE — 3288F FALL RISK ASSESSMENT DOCD: CPT | Performed by: FAMILY MEDICINE

## 2021-06-29 PROCEDURE — G8399 PT W/DXA RESULTS DOCUMENT: HCPCS | Performed by: FAMILY MEDICINE

## 2021-06-29 PROCEDURE — G8754 DIAS BP LESS 90: HCPCS | Performed by: FAMILY MEDICINE

## 2021-06-29 RX ORDER — NETARSUDIL AND LATANOPROST OPHTHALMIC SOLUTION, 0.02%/0.005% .2; .05 MG/ML; MG/ML
SOLUTION/ DROPS OPHTHALMIC; TOPICAL
COMMUNITY
Start: 2021-06-11

## 2021-06-29 RX ORDER — ACETAZOLAMIDE 500 MG/1
CAPSULE, EXTENDED RELEASE ORAL
COMMUNITY
Start: 2021-06-04

## 2021-06-29 NOTE — PROGRESS NOTES
Quinn Reyna presents today for   Chief Complaint   Patient presents with    Pre-op Exam     left thumb, and right eye       Is someone accompanying this pt? no    Is the patient using any DME equipment during OV? Yes a cane    Depression Screening:  3 most recent PHQ Screens 5/18/2021   PHQ Not Done Active Diagnosis of Depression or Bipolar Disorder   Little interest or pleasure in doing things -   Feeling down, depressed, irritable, or hopeless -   Total Score PHQ 2 -   Trouble falling or staying asleep, or sleeping too much -   Feeling tired or having little energy -   Poor appetite, weight loss, or overeating -   Feeling bad about yourself - or that you are a failure or have let yourself or your family down -   Trouble concentrating on things such as school, work, reading, or watching TV -   Moving or speaking so slowly that other people could have noticed; or the opposite being so fidgety that others notice -   Thoughts of being better off dead, or hurting yourself in some way -   How difficult have these problems made it for you to do your work, take care of your home and get along with others -       Learning Assessment:  Learning Assessment 9/17/2020   PRIMARY LEARNER Patient   HIGHEST LEVEL OF EDUCATION - PRIMARY LEARNER  GRADUATED HIGH SCHOOL OR GED   BARRIERS PRIMARY LEARNER NONE   CO-LEARNER CAREGIVER No   PRIMARY LANGUAGE ENGLISH   LEARNER PREFERENCE PRIMARY LISTENING     -     -   ANSWERED BY Quentin Mirza   RELATIONSHIP SELF       Abuse Screening:  Abuse Screening Questionnaire 10/29/2020   Do you ever feel afraid of your partner? N   Are you in a relationship with someone who physically or mentally threatens you? N   Is it safe for you to go home? Y       Fall Risk  Fall Risk Assessment, last 12 mths 6/29/2021   Able to walk? Yes   Fall in past 12 months? 0   Do you feel unsteady?  0   Are you worried about falling 0   Number of falls in past 12 months -   Fall with injury? -       Health Maintenance reviewed and discussed and ordered per Provider. Health Maintenance Due   Topic Date Due    Eye Exam Retinal or Dilated  Never done    Shingrix Vaccine Age 50> (2 of 2) 02/13/2019   . Coordination of Care:  1. Have you been to the ER, urgent care clinic since your last visit? Hospitalized since your last visit? no    2. Have you seen or consulted any other health care providers outside of the [address] [place] [address] since your last visit? Include any pap smears or colon screening.  no      Last  Checked na  Last UDS Checked na  Last Pain contract signed: na    Pre-op exam

## 2021-06-29 NOTE — PROGRESS NOTES
Eliana Granado is a 76 y.o.  female and presents with    Chief Complaint   Patient presents with    Pre-op Exam     left thumb, and right eye     Subjective:  She is here for pre op exam.  She is scheduled to undergo trabeculectomy with ophthalmologist.  She is also scheduled for left thumb surgery. Cardiovascular Review:  The patient has diabetes, hyperlipidemia and obesity. Diet and Lifestyle: not attempting to follow a low fat, low cholesterol diet, not attempting to follow a low sodium diet, does not rigorously follow a diabetic diet, sedentary, nonsmoker  Home BP Monitoring: is not measured at home. Pertinent ROS: taking medications as instructed, no medication side effects noted, no TIA's, no chest pain on exertion, no dyspnea on exertion, no swelling of ankles. Depression Review:  Patient is seen for followup of depression. Treatment includes lamotrigine, quetiapine, lorazepam and individual therapy. Ongoing symptoms include depressed mood, weight gain, insomnia, fatigue, feelings of worthlessness/guilt and difficulty concentrating. She denies recurrent thoughts of death. She experiences the following side effects from the treatment: none   She has chronic pain and is followed by orthopedic surgeon at this time.       She has had 6 bilateral TMJ surgeries     She has hypothyroid.   ROS   All other systems reviewed and are negative.         Objective:  Vitals:    06/29/21 1536 06/29/21 1548   BP: (!) 142/96 122/88   Pulse: 82    Resp: 16    Temp: 97.9 °F (36.6 °C)    TempSrc: Temporal    SpO2: 99%    Weight: 257 lb (116.6 kg)    Height: 5' 4\" (1.626 m)    LMP: 03/03/1994       alert, well appearing, and in no distress, oriented to person, place, and time and morbidly obese  Mental status - normal mood, behavior, speech, dress, motor activity, and thought processes  Chest - clear to auscultation, no wheezes, rales or rhonchi, symmetric air entry  Heart - normal rate, regular rhythm, normal S1, S2, no murmurs, rubs, clicks or gallops  Neurological - cranial nerves II through XII intact  Musculoskeletal - abnormal exam of left thumb with pain with motion    LABS   hgb a1c 7.1    TESTS      Assessment/Plan:    1. Primary osteoarthritis of first carpometacarpal joint of left hand  F/u with ortho for surgery; cleared for surgery    2. Angle-closure glaucoma, severe stage  Scheduled for trabeculectomy; cleared for surgery    3. Type 2 diabetes mellitus without complication, without long-term current use of insulin (HCC)  Goal hgb a1c <7; borderline controlled; continue current treatment    4. Postoperative hypothyroidism  Therapeutic dosing of levothyroxine    5. Essential hypertension  Goal <130/80      Lab review: labs reviewed, I note that glycosylated hemoglobin mildly abnormal but acceptable, hemogram normal, TSH therapeutic      I have discussed the diagnosis with the patient and the intended plan as seen in the above orders. The patient has received an after-visit summary and questions were answered concerning future plans. I have discussed medication side effects and warnings with the patient as well. I have reviewed the plan of care with the patient, accepted their input and they are in agreement with the treatment goals.

## 2021-08-23 NOTE — TELEPHONE ENCOUNTER
Followed up with Veronica. Verified . Reviewed MD recommendations below which patient understands instructions provided and appreciative for follow up.      Currently in [facility] and planning to call back once she is back to coordinate office visit lara States she called [phone] and it is ENT and not pain management. Has only been taking the synthroid about 2 weeks when her labs were drawn.     She did sign in for todays appt however there was a problem with doxy me and she never showed up in the waiting room to start the visit

## 2021-09-16 DIAGNOSIS — E89.0 POSTOPERATIVE HYPOTHYROIDISM: ICD-10-CM

## 2021-09-17 RX ORDER — LEVOTHYROXINE SODIUM 100 UG/1
TABLET ORAL
Qty: 90 TABLET | Refills: 1 | Status: SHIPPED | OUTPATIENT
Start: 2021-09-17 | End: 2022-03-12

## 2021-11-11 ENCOUNTER — OFFICE VISIT (OUTPATIENT)
Dept: FAMILY MEDICINE CLINIC | Age: 66
End: 2021-11-11
Payer: MEDICARE

## 2021-11-11 ENCOUNTER — HOSPITAL ENCOUNTER (OUTPATIENT)
Dept: LAB | Age: 66
Discharge: HOME | End: 2021-11-11
Payer: MEDICARE

## 2021-11-11 VITALS
SYSTOLIC BLOOD PRESSURE: 128 MMHG | HEIGHT: 64 IN | RESPIRATION RATE: 16 BRPM | HEART RATE: 79 BPM | OXYGEN SATURATION: 96 % | DIASTOLIC BLOOD PRESSURE: 74 MMHG | WEIGHT: 265.8 LBS | TEMPERATURE: 97.2 F | BODY MASS INDEX: 45.38 KG/M2

## 2021-11-11 DIAGNOSIS — E11.9 TYPE 2 DIABETES MELLITUS WITHOUT COMPLICATION, WITHOUT LONG-TERM CURRENT USE OF INSULIN (HCC): ICD-10-CM

## 2021-11-11 DIAGNOSIS — H93.13 TINNITUS OF BOTH EARS: ICD-10-CM

## 2021-11-11 DIAGNOSIS — M35.9 AUTOIMMUNE DISEASE (HCC): ICD-10-CM

## 2021-11-11 DIAGNOSIS — E66.01 OBESITY, MORBID, BMI 40.0-49.9 (HCC): ICD-10-CM

## 2021-11-11 DIAGNOSIS — Z12.31 SCREENING MAMMOGRAM FOR BREAST CANCER: ICD-10-CM

## 2021-11-11 DIAGNOSIS — H40.10X0 OPEN-ANGLE GLAUCOMA OF BOTH EYES, UNSPECIFIED GLAUCOMA STAGE, UNSPECIFIED OPEN-ANGLE GLAUCOMA TYPE: ICD-10-CM

## 2021-11-11 DIAGNOSIS — Z23 NEEDS FLU SHOT: ICD-10-CM

## 2021-11-11 DIAGNOSIS — Z00.00 MEDICARE ANNUAL WELLNESS VISIT, INITIAL: Primary | ICD-10-CM

## 2021-11-11 DIAGNOSIS — I10 ESSENTIAL HYPERTENSION: ICD-10-CM

## 2021-11-11 DIAGNOSIS — F31.81 BIPOLAR 2 DISORDER, MAJOR DEPRESSIVE EPISODE (HCC): ICD-10-CM

## 2021-11-11 DIAGNOSIS — Z71.89 ADVANCE CARE PLANNING: ICD-10-CM

## 2021-11-11 LAB
CHOLEST SERPL-MCNC: 207 MG/DL
CREAT UR-MCNC: 46 MG/DL (ref 30–125)
HDLC SERPL-MCNC: 57 MG/DL (ref 40–60)
HDLC SERPL: 3.6 {RATIO} (ref 0–5)
LDLC SERPL CALC-MCNC: 132.2 MG/DL (ref 0–100)
LIPID PROFILE,FLP: ABNORMAL
MICROALBUMIN UR-MCNC: 0.57 MG/DL (ref 0–3)
MICROALBUMIN/CREAT UR-RTO: 12 MG/G (ref 0–30)
TRIGL SERPL-MCNC: 89 MG/DL (ref ?–150)
VLDLC SERPL CALC-MCNC: 17.8 MG/DL

## 2021-11-11 PROCEDURE — G8417 CALC BMI ABV UP PARAM F/U: HCPCS | Performed by: FAMILY MEDICINE

## 2021-11-11 PROCEDURE — G9717 DOC PT DX DEP/BP F/U NT REQ: HCPCS | Performed by: FAMILY MEDICINE

## 2021-11-11 PROCEDURE — 99497 ADVNCD CARE PLAN 30 MIN: CPT | Performed by: FAMILY MEDICINE

## 2021-11-11 PROCEDURE — G0008 ADMIN INFLUENZA VIRUS VAC: HCPCS | Performed by: FAMILY MEDICINE

## 2021-11-11 PROCEDURE — G8399 PT W/DXA RESULTS DOCUMENT: HCPCS | Performed by: FAMILY MEDICINE

## 2021-11-11 PROCEDURE — 1090F PRES/ABSN URINE INCON ASSESS: CPT | Performed by: FAMILY MEDICINE

## 2021-11-11 PROCEDURE — 90694 VACC AIIV4 NO PRSRV 0.5ML IM: CPT | Performed by: FAMILY MEDICINE

## 2021-11-11 PROCEDURE — 82043 UR ALBUMIN QUANTITATIVE: CPT

## 2021-11-11 PROCEDURE — 3051F HG A1C>EQUAL 7.0%<8.0%: CPT | Performed by: FAMILY MEDICINE

## 2021-11-11 PROCEDURE — G0439 PPPS, SUBSEQ VISIT: HCPCS | Performed by: FAMILY MEDICINE

## 2021-11-11 PROCEDURE — G8754 DIAS BP LESS 90: HCPCS | Performed by: FAMILY MEDICINE

## 2021-11-11 PROCEDURE — 2022F DILAT RTA XM EVC RTNOPTHY: CPT | Performed by: FAMILY MEDICINE

## 2021-11-11 PROCEDURE — 3017F COLORECTAL CA SCREEN DOC REV: CPT | Performed by: FAMILY MEDICINE

## 2021-11-11 PROCEDURE — 99214 OFFICE O/P EST MOD 30 MIN: CPT | Performed by: FAMILY MEDICINE

## 2021-11-11 PROCEDURE — 80061 LIPID PANEL: CPT

## 2021-11-11 PROCEDURE — G8536 NO DOC ELDER MAL SCRN: HCPCS | Performed by: FAMILY MEDICINE

## 2021-11-11 PROCEDURE — 1101F PT FALLS ASSESS-DOCD LE1/YR: CPT | Performed by: FAMILY MEDICINE

## 2021-11-11 PROCEDURE — G9899 SCRN MAM PERF RSLTS DOC: HCPCS | Performed by: FAMILY MEDICINE

## 2021-11-11 PROCEDURE — G8427 DOCREV CUR MEDS BY ELIG CLIN: HCPCS | Performed by: FAMILY MEDICINE

## 2021-11-11 PROCEDURE — 36415 COLL VENOUS BLD VENIPUNCTURE: CPT

## 2021-11-11 PROCEDURE — G8752 SYS BP LESS 140: HCPCS | Performed by: FAMILY MEDICINE

## 2021-11-11 NOTE — PROGRESS NOTES
Edie Ellison is a 76 y.o. female (: 1955) presenting to address:    No chief complaint on file. There were no vitals filed for this visit. Hearing/Vision:   No exam data present    Learning Assessment:     Learning Assessment 2020   PRIMARY LEARNER Patient   HIGHEST LEVEL OF EDUCATION - PRIMARY LEARNER  GRADUATED HIGH SCHOOL OR GED   BARRIERS PRIMARY LEARNER NONE   CO-LEARNER CAREGIVER No   PRIMARY LANGUAGE ENGLISH   LEARNER PREFERENCE PRIMARY LISTENING     -     -   ANSWERED BY Yared Hudson   RELATIONSHIP SELF     Depression Screening:     3 most recent PHQ Screens 2021   PHQ Not Done -   Little interest or pleasure in doing things Not at all   Feeling down, depressed, irritable, or hopeless Not at all   Total Score PHQ 2 0   Trouble falling or staying asleep, or sleeping too much -   Feeling tired or having little energy -   Poor appetite, weight loss, or overeating -   Feeling bad about yourself - or that you are a failure or have let yourself or your family down -   Trouble concentrating on things such as school, work, reading, or watching TV -   Moving or speaking so slowly that other people could have noticed; or the opposite being so fidgety that others notice -   Thoughts of being better off dead, or hurting yourself in some way -   How difficult have these problems made it for you to do your work, take care of your home and get along with others -     Fall Risk Assessment:     Fall Risk Assessment, last 12 mths 2021   Able to walk? Yes   Fall in past 12 months? 1   Do you feel unsteady? 1   Are you worried about falling 1   Is TUG test greater than 12 seconds? 1   Is the gait abnormal? 1   Number of falls in past 12 months 2   Fall with injury? 0     Abuse Screening:     Abuse Screening Questionnaire 2021   Do you ever feel afraid of your partner? N   Are you in a relationship with someone who physically or mentally threatens you?  N   Is it safe for you to go home? Y     ADL Assessment:   No flowsheet data found. Coordination of Care Questionaire:   1. Have you been to the ER, urgent care clinic since your last visit? Hospitalized since your last visit? NO    2. Have you seen or consulted any other health care providers outside of the [address] [place] [address] since your last visit? Include any pap smears or colon screening. YES orthopedic ,optamologist    Advanced Directive:   1. Do you have an Advanced Directive? YES    2. Would you like information on Advanced Directives?  NO

## 2021-11-11 NOTE — PROGRESS NOTES
(AWV) The Initial Medicare Annual Wellness Exam PROGRESS NOTE    This is an Initial Medicare Annual Wellness Exam (AWV)     I have reviewed the patient's medical history in detail and updated the computerized patient record. Stephany Guy is a 76 y.o.  female and presents for an annual wellness exam     ROS   General ROS: negative for - chills or fever  Psychological ROS: positive for - anxiety and sleep disturbances  Ophthalmic ROS: positive for - uses glasses  Endocrine ROS: negative for - polydipsia/polyuria or unexpected weight changes  Respiratory ROS: no cough, shortness of breath, or wheezing  Cardiovascular ROS: no chest pain or dyspnea on exertion  Gastrointestinal ROS: no abdominal pain, change in bowel habits, or black or bloody stools  Genito-Urinary ROS: no dysuria, trouble voiding, or hematuria  Neurological ROS: negative for - memory loss  Dermatological ROS: negative for - rash or skin lesion changes     All other systems reviewed and are negative.     History     Past Medical History:   Diagnosis Date    Allergic rhinitis     Anxiety disorder     Arthritis 2/29/2012    Arthritis of knee     left  greater than right    Autoimmune disease (HCC)     Back pain     Bilateral cataracts     Bipolar 1 disorder ([address] 4/12/2013    Bipolar disorder (HCC)     Chronic pain     Constipation, chronic     COPD (chronic obstructive pulmonary disease) (HCC)     Depression     Eczema     Fibromyalgia     Gastritis     GERD (gastroesophageal reflux disease)     Glaucoma 2/29/2012    Glaucoma     Hallux valgus     left foot    Hypertension     IBS (irritable bowel syndrome)     Ill-defined condition     Lumbago     Migraine headache     Nervous disorder     Neuritis     Osteoarthritis     Pain in joint, lower leg     Pain in joint, shoulder region     Pelvic relaxation disorder     Personality disorder ([address]     Psychiatric disorder     Bipolar    Psychosis ([facility] 2/29/2012    PTSD (post-traumatic stress disorder)     Schizophrenia ([facility] 2/29/2012    Scoliosis     Spondylolisthesis     TMJ dysfunction     Trochanteric bursitis     Ulcers of both great toes (HCC)     Vertigo       Past Surgical History:   Procedure Laterality Date    HX ABDOMINOPLASTY      HX BREAST REDUCTION Bilateral 1997    HX BUNIONECTOMY      left foot    HX CYSTOCELE REPAIR  1994    HX HAMMER TOE REPAIR      HX KNEE ARTHROSCOPY      HX OTHER SURGICAL      jaw implant    HX TONSILLECTOMY      HX TOTAL VAGINAL HYSTERECTOMY  1994    prolaplsed ut    ID REPAIR OF RECTOCELE  1994     Current Outpatient Medications   Medication Sig Dispense Refill    levothyroxine (SYNTHROID) 100 mcg tablet TAKE 1 TABLET BY MOUTH EVERY DAY BEFORE BREAKFAST 90 Tablet 1    Rocklatan 0.02-0.005 % drop INSTILL 1 DROP INTO BOTH EYES AT BEDTIME      lisinopriL (PRINIVIL, ZESTRIL) 10 mg tablet TAKE 1 TABLET BY MOUTH  DAILY 90 Tab 3    hydroCHLOROthiazide (MICROZIDE) 12.5 mg capsule TAKE 1 CAPSULE BY MOUTH  DAILY 90 Cap 3    atorvastatin (LIPITOR) 40 mg tablet TAKE 1 TABLET BY MOUTH  DAILY 90 Tab 3    pantoprazole (PROTONIX) 40 mg tablet TAKE 1 TABLET BY MOUTH  DAILY 90 Tab 3    naproxen (NAPROSYN) 500 mg tablet TAKE 1 TABLET BY MOUTH  TWICE DAILY WITH MEALS 180 Tab 3    lamoTRIgine (LaMICtal) 200 mg tablet Take 1 Tab by mouth daily. 30 Tab 0    QUEtiapine (SEROquel) 200 mg tablet Take 1 Tab by mouth daily. 30 Tab 0    ALPRAZolam (XANAX) 0.5 mg tablet As needed for Anxiety      cholecalciferol, vitamin D3, (Vitamin D3) 50 mcg (2,000 unit) tab Take  by mouth daily.  halobetasol (ULTRAVATE) 0.05 % ointment Apply  to affected area two (2) times a day. use thin layer 50 g 0    doxepin (SINEQUAN) 50 mg capsule Take  by mouth nightly.  brimonidine-timolol (COMBIGAN) 0.2-0.5 % drop ophthalmic solution 1 Drop every twelve (12) hours.  KRILL OIL PO Take 300 mg by mouth.       VITAMIN B COMPLEX (B-50 COMPLEX PO) Take  by mouth daily.  cycloSPORINE (RESTASIS) 0.05 % ophthalmic emulsion Administer 1 Drop to both eyes two (2) times a day. Indications: DRY EYE      carboxymethylcellulose sodium (REFRESH LIQUIGEL) 1 % Drop Apply  to eye.       acetaZOLAMIDE SR (DIAMOX) 500 mg capsule       methylPREDNISolone (MEDROL DOSEPACK) 4 mg tablet Per dose pack instructions (Patient not taking: Reported on 2021) 1 Dose Pack 0    albuterol (Ventolin HFA) 90 mcg/actuation inhaler INHALE 2 PUFFS BY MOUTH EVERY 4 HOURS AS NEEDED FOR WHEEZE (Patient not taking: Reported on 2021)       Allergies   Allergen Reactions    Aluminum Anaphylaxis    Chromium Anaphylaxis    Cobalt Anaphylaxis    Copper Anaphylaxis    Cromolyn Anaphylaxis    Medical Resources Anaphylaxis     Medical mesh, proplast and juan    Nickel Anaphylaxis    Zinc Anaphylaxis    Adhesive Rash    Benylin Dm Other (comments)     cannot take glaucoma pressure    Flexeril [Cyclobenzaprine] Other (comments)     Metallic taste, dry eyes and mouth, headaches    Gabapentin Other (comments)     Dizziness, clumsiness, confusion     Family History   Problem Relation Age of Onset    Heart Disease Father     Diabetes Sister     Headache Sister     Suicide Brother     Substance Abuse Paternal Uncle     High Cholesterol Sister      Social History     Tobacco Use    Smoking status: Former Smoker     Packs/day: 0.50     Years: 22.00     Pack years: 11.00     Types: Cigarettes     Quit date: 2014     Years since quittin.4    Smokeless tobacco: Never Used    Tobacco comment: Electronic cigarettes   Substance Use Topics    Alcohol use: No     Patient Active Problem List   Diagnosis Code    Glaucoma H40.9    Primary osteoarthritis of left knee M17.12    TMJ dysfunction M26.609    Lumbago M54.50    Bipolar 2 disorder, major depressive episode (HCC) F31.81    Carpal tunnel syndrome G56.00    Cervicalgia M54.2    Myalgia and myositis, unspecified [id number]    Lumbosacral spondylosis without myelopathy M47.817    Degeneration of lumbar or lumbosacral intervertebral disc M51.37    Migraine without aura, without mention of intractable migraine without mention of status migrainosus G43.009    Encounter for long-term (current) use of other medications Z79.899    Hip bursitis M70.70    Bilateral knee pain M25.561, M25.562    Hypertension I10    Tinnitus H93.19    Hyperlipemia E78.5    Autoimmune disease ([address] M35.9    Type 2 diabetes mellitus without complication, without long-term current use of insulin ([facility]) E11.9    S/P thyroidectomy E89.0    Osteoarthritis of right knee M17.11    Obesity, morbid, BMI 40.0-49.9 ([facility]) E66.01    Hypothyroidism E03.9    GERD (gastroesophageal reflux disease) K21.9    Bochdalek hernia Q79.0    Anxiety F41.9    Pulmonary nodule R91.1    Fibromyalgia M79.7    Bilateral cataracts H26.9    Tobacco abuse, in remission F17.201       Health Maintenance History  Immunizations reviewed, dtap up to date, pneumovax, flu due, zoster due  Colonoscopy: up to date,   Eye exam: up to date  Mammo up to date  Dexascan due        Depression Risk Factor Screening:      Depression diagnosis    Alcohol Risk Factor Screening:     Women: On any occasion during the past 3 months, have you had more than 3 drinks containing alcohol? no   Do you average more than 7 drinks per week? no  Functional Ability and Level of Safety:     Hearing Loss    The patient needs further evaluation. Activities of Daily Living   Self-care.    Requires assistance with: no ADLs    Fall Risk   Secondary diagnoses (15 pts)  Score: 15    Abuse Screen   Patient is not abused    Examination   Physical Examination  Vitals:    11/11/21 1059 11/11/21 1102   BP: (!) 136/90 (!) 132/92   Pulse: 79    Resp: 16    Temp: 97.2 °F (36.2 °C)    TempSrc: Temporal    SpO2: 96%    Weight: 265 lb 12.8 oz (120.6 kg)    Height: 5' 4\" (1.626 m)    PainSc:   7 LMP: 03/03/1994      Body mass index is 45.62 kg/m². Evaluation of Cognitive Function:  Mood/affect: good mood with laughter  Appearance: well kempt  Family member/caregiver input: n/a    alert, well appearing, and in no distress, oriented to person, place, and time and morbidly obese    Patient Care Team:  Angel Lizarraga MD as PCP - General (Family Medicine)  Angel Lizarraga MD as PCP - [facility] Empaneled Provider  Fernand Moritz, MD (Orthopedic Surgery)  Truman Farrell MD (Unknown Physician Specialty)  Marina Mccarthy MD (Psychiatry)  Kev Palma MD (Inactive) (Ophthalmology)  Andra Delgado MD (Psychiatry)  Piedad Lombard, MD (Anesthesiology)    End-of-life planning  Advanced Directive in the case than an injury or illness causes the patient to be unable to make health care decisions    Health Care Directive or Living Will: yes    Advice/Referrals/Counselling/Plan:   Education and counseling provided:  End-of-Life planning (with patient's consent)  Influenza Vaccine  Diabetes screening test  Include in education list (weight loss, physical activity, smoking cessation, fall prevention, and nutrition)    ICD-10-CM ICD-9-CM    1. Medicare annual wellness visit, initial  Z00.00 V70.0    2. Advance care planning  Z71.89 V65.49 ADVANCE CARE PLANNING FIRST 30 MINS   3. Open-angle glaucoma of both eyes, unspecified glaucoma stage, unspecified open-angle glaucoma type  H40.10X0 365.10      365.70    4. Type 2 diabetes mellitus without complication, without long-term current use of insulin ([facility])  E11.9 250.00 MICROALBUMIN, UR, RAND W/ MICROALB/CREAT RATIO      LIPID PANEL   5. Essential hypertension  I10 401.9    6. Tinnitus of both ears  H93.13 388.30    7. Autoimmune disease ([facility]  M35.9 279.49    8. Bipolar 2 disorder, major depressive episode ([facility]  F31.81 296.89    9.  Obesity, morbid, BMI 40.0-49.9 ([facility])  E66.01 278.01 REFERRAL TO BARIATRIC SURGERY   10. Screening mammogram for breast cancer  Z12.31 V76.12 [facility] MAMMO BI SCREENING INCL CAD   6. Needs flu shot  Z23 V04.81 INFLUENZA VIRUS VACCINE, HIGH DOSE SEASONAL, PRESERVATIVE FREE      ADMIN INFLUENZA VIRUS VAC   . Brief written plan, checklist    I have discussed the diagnosis with the patient and the intended plan as seen in the above orders. The patient has received an after-visit summary and questions were answered concerning future plans. I have discussed medication side effects and warnings with the patient as well. I have reviewed the plan of care with the patient, accepted their input and they are in agreement with the treatment goals. ____________________________________________________________    Problem Assessment    for treatment of   Chief Complaint   Patient presents with    Annual Wellness Visit    GERD         SUBJECTIVE    Well Adult Physical   Patient here for a comprehensive physical exam.The patient reports problems - tinnitus and she has been followed by Dr. Robledo Headings  Do you take any herbs or supplements that were not prescribed by a doctor? yes Are you taking calcium supplements? yes Are you taking aspirin daily? no    She is followed by Dr. Eric Jeronimo for glaucoma. She has had this condition for 24 years. She can see color and outline of figures. She gets double vision in the evening. She also sees an ophthalmology plastic surgeon and she has basal cell carcinoma below right lower eyelid. She had ablation with orthopedic pain, Dr. Joss Mathias. Cardiovascular Review:  The patient has diabetes, hyperlipidemia and obesity. Diet and [facility] attempting to follow a low fat, low cholesterol diet, not attempting to follow a low sodium diet, does not rigorously follow a diabetic diet, sedentary, nonsmoker  Home BP Monitoring: is not measured at home.   Pertinent ROS: taking medications as instructed, no medication side effects noted, no TIA's, no chest pain on exertion, no dyspnea on exertion, no swelling of ankles.   Depression Review:  Patient is seen for followup of depression. Treatment includes lamotrigine, quetiapine, lorazepam and individual therapy. Ongoing symptoms include depressed mood, weight gain, insomnia, fatigue, feelings of worthlessness/guilt and difficulty concentrating. She denies recurrent thoughts of death.   She experiences the following side effects from the treatment: none   She has chronic pain and is followed by orthopedic surgeon at this time.       She has had 6 bilateral TMJ surgeries     She has hypothyroid. Visit Vitals  /74 (BP 1 Location: Right arm)   Pulse 79   Temp 97.2 °F (36.2 °C) (Temporal)   Resp 16   Ht 5' 4\" (1.626 m)   Wt 265 lb 12.8 oz (120.6 kg)   LMP 03/03/1994   SpO2 96%   BMI 45.62 kg/m²     General:  Alert, cooperative, no distress, appears stated age. Head:  Normocephalic, without obvious abnormality, atraumatic. Eyes:  Conjunctivae/corneas clear. PERRL, EOMs intact. Fundi benign. Ears:  Normal TMs and external ear canals both ears. Nose: Nares normal. Septum midline. Mucosa normal. No drainage or sinus tenderness. Throat: Lips, mucosa, and tongue normal. Teeth and gums normal.   Neck: Supple, symmetrical, trachea midline, no adenopathy, thyroid: no enlargement/tenderness/nodules and no JVD. Back:   Symmetric, no curvature. ROM normal. No CVA tenderness. Lungs:   Clear to auscultation bilaterally. Chest wall:  No tenderness or deformity. Heart:  Regular rate and rhythm, S1, S2 normal, no murmur, click, rub or gallop. Breast Exam:  Not examined   Abdomen:   Soft, non-tender. Bowel sounds normal. No masses,  No organomegaly. Genitalia:  deferred   Rectal:  deferred   Extremities: Extremities normal, atraumatic, no cyanosis or edema. Pulses: 2+ and symmetric all extremities. Skin: Skin color, texture, turgor normal. No rashes or lesions.    Lymph nodes: Cervical, supraclavicular, and axillary nodes normal. Neurologic: CNII-XII intact. Normal strength, sensation and reflexes throughout. LABS     TESTS    Assessment/Plan:    1. Medicare annual wellness visit, initial  Reviewed preventive recommendations    2. Advance care planning  See ACP    3. Open-angle glaucoma of both eyes, unspecified glaucoma stage, unspecified open-angle glaucoma type  F/u with ophthalmology    4. Type 2 diabetes mellitus without complication, without long-term current use of insulin (HCC)  Goal hgb a1c <7; encourage healthy lifestyle  - MICROALBUMIN, UR, RAND W/ MICROALB/CREAT RATIO; Future  - LIPID PANEL; Future    5. Essential hypertension  Goal <130/80    6. Tinnitus of both ears  F/u with ENT    7. Autoimmune disease ([facility]  F/u with rheumatologist    8. Bipolar 2 disorder, major depressive episode ([facility]  F/u with psychiatrist and continue current management    9. Obesity, morbid, BMI 40.0-49.9 ([facility]  Refer for further evaluation and treatment  - REFERRAL TO BARIATRIC SURGERY    10. Screening mammogram for breast cancer    - [facility] MAMMO BI SCREENING INCL CAD; Future    11.  Needs flu shot    - INFLUENZA VIRUS VACCINE, HIGH DOSE SEASONAL, PRESERVATIVE FREE  - ADMIN INFLUENZA VIRUS VAC    Lab review: orders written for new lab studies as appropriate; see orders

## 2022-02-24 DIAGNOSIS — I10 ESSENTIAL HYPERTENSION: ICD-10-CM

## 2022-02-28 RX ORDER — LISINOPRIL 10 MG/1
TABLET ORAL
Qty: 90 TABLET | Refills: 3 | Status: SHIPPED | OUTPATIENT
Start: 2022-02-28

## 2022-03-10 DIAGNOSIS — E89.0 POSTOPERATIVE HYPOTHYROIDISM: ICD-10-CM

## 2022-03-12 RX ORDER — LEVOTHYROXINE SODIUM 100 UG/1
TABLET ORAL
Qty: 90 TABLET | Refills: 1 | Status: SHIPPED | OUTPATIENT
Start: 2022-03-12 | End: 2022-09-20 | Stop reason: SDUPTHER

## 2022-03-18 PROBLEM — E66.01 OBESITY, MORBID, BMI 40.0-49.9 (HCC): Status: ACTIVE | Noted: 2018-01-16

## 2022-03-18 PROBLEM — M17.11 OSTEOARTHRITIS OF RIGHT KNEE: Status: ACTIVE | Noted: 2017-10-01

## 2022-03-18 PROBLEM — E03.9 HYPOTHYROIDISM: Status: ACTIVE | Noted: 2018-10-19

## 2022-03-18 PROBLEM — F41.9 ANXIETY: Status: ACTIVE | Noted: 2018-10-29

## 2022-03-19 PROBLEM — K21.9 GERD (GASTROESOPHAGEAL REFLUX DISEASE): Status: ACTIVE | Noted: 2020-09-17

## 2022-03-19 PROBLEM — E11.9 TYPE 2 DIABETES MELLITUS WITHOUT COMPLICATION, WITHOUT LONG-TERM CURRENT USE OF INSULIN (HCC): Status: ACTIVE | Noted: 2019-07-08

## 2022-03-20 PROBLEM — Q79.0 BOCHDALEK HERNIA: Status: ACTIVE | Noted: 2020-02-18

## 2022-03-20 PROBLEM — F17.201 TOBACCO ABUSE, IN REMISSION: Status: ACTIVE | Noted: 2020-10-29

## 2022-03-29 DIAGNOSIS — E78.2 MIXED HYPERLIPIDEMIA: ICD-10-CM

## 2022-03-30 RX ORDER — ATORVASTATIN CALCIUM 40 MG/1
TABLET, FILM COATED ORAL
Qty: 90 TABLET | Refills: 3 | Status: SHIPPED | OUTPATIENT
Start: 2022-03-30

## 2022-04-01 DIAGNOSIS — K21.9 GASTROESOPHAGEAL REFLUX DISEASE: ICD-10-CM

## 2022-04-01 NOTE — TELEPHONE ENCOUNTER
Patient is requesting a refill for the following prescription.     Requested Prescriptions     Pending Prescriptions Disp Refills    pantoprazole (PROTONIX) 40 mg tablet 90 Tablet 3     Sig: TAKE 1 TABLET BY MOUTH  DAILY

## 2022-04-04 RX ORDER — PANTOPRAZOLE SODIUM 40 MG/1
TABLET, DELAYED RELEASE ORAL
Qty: 90 TABLET | Refills: 3 | Status: SHIPPED | OUTPATIENT
Start: 2022-04-04 | End: 2022-07-28 | Stop reason: SDUPTHER

## 2022-04-20 DIAGNOSIS — I10 ESSENTIAL HYPERTENSION: ICD-10-CM

## 2022-04-20 NOTE — TELEPHONE ENCOUNTER
Pt is requesting a refill on the following prescription.     Requested Prescriptions     Pending Prescriptions Disp Refills    hydroCHLOROthiazide (MICROZIDE) 12.5 mg capsule 90 Capsule 3     Sig: TAKE 1 CAPSULE BY MOUTH  DAILY

## 2022-04-22 RX ORDER — HYDROCHLOROTHIAZIDE 12.5 MG/1
CAPSULE ORAL
Qty: 90 CAPSULE | Refills: 3 | Status: SHIPPED | OUTPATIENT
Start: 2022-04-22

## 2022-05-16 DIAGNOSIS — M79.7 FIBROMYALGIA: ICD-10-CM

## 2022-05-17 RX ORDER — NAPROXEN 500 MG/1
TABLET ORAL
Qty: 180 TABLET | Refills: 2 | Status: SHIPPED | OUTPATIENT
Start: 2022-05-17 | End: 2022-07-28 | Stop reason: SDUPTHER

## 2022-06-16 ENCOUNTER — PATIENT MESSAGE (OUTPATIENT)
Dept: FAMILY MEDICINE CLINIC | Age: 67
End: 2022-06-16

## 2022-07-07 ENCOUNTER — TELEPHONE (OUTPATIENT)
Dept: MAMMOGRAPHY | Age: 67
End: 2022-07-07

## 2022-07-28 DIAGNOSIS — K21.9 GASTROESOPHAGEAL REFLUX DISEASE: ICD-10-CM

## 2022-07-28 DIAGNOSIS — M79.7 FIBROMYALGIA: ICD-10-CM

## 2022-07-29 RX ORDER — NAPROXEN 500 MG/1
TABLET ORAL
Qty: 180 TABLET | Refills: 2 | Status: SHIPPED | OUTPATIENT
Start: 2022-07-29

## 2022-07-29 RX ORDER — PANTOPRAZOLE SODIUM 40 MG/1
TABLET, DELAYED RELEASE ORAL
Qty: 90 TABLET | Refills: 3 | Status: SHIPPED | OUTPATIENT
Start: 2022-07-29

## 2022-08-23 ENCOUNTER — OFFICE VISIT (OUTPATIENT)
Dept: FAMILY MEDICINE CLINIC | Age: 67
End: 2022-08-23
Payer: MEDICARE

## 2022-08-23 VITALS
RESPIRATION RATE: 16 BRPM | TEMPERATURE: 98.1 F | DIASTOLIC BLOOD PRESSURE: 86 MMHG | HEART RATE: 82 BPM | SYSTOLIC BLOOD PRESSURE: 136 MMHG | WEIGHT: 233 LBS | BODY MASS INDEX: 39.78 KG/M2 | HEIGHT: 64 IN | OXYGEN SATURATION: 98 %

## 2022-08-23 DIAGNOSIS — M35.9 AUTOIMMUNE DISEASE (HCC): ICD-10-CM

## 2022-08-23 DIAGNOSIS — Z01.818 PRE-OP EXAM: Primary | ICD-10-CM

## 2022-08-23 DIAGNOSIS — H40.10X0 OPEN-ANGLE GLAUCOMA OF BOTH EYES, UNSPECIFIED GLAUCOMA STAGE, UNSPECIFIED OPEN-ANGLE GLAUCOMA TYPE: ICD-10-CM

## 2022-08-23 DIAGNOSIS — F31.81 BIPOLAR 2 DISORDER, MAJOR DEPRESSIVE EPISODE (HCC): ICD-10-CM

## 2022-08-23 DIAGNOSIS — E89.0 POSTOPERATIVE HYPOTHYROIDISM: ICD-10-CM

## 2022-08-23 DIAGNOSIS — Z12.11 SCREEN FOR COLON CANCER: ICD-10-CM

## 2022-08-23 DIAGNOSIS — T80.1XXA VASCULAR COMPLICATIONS FOLLOWING INFUSION, TRANSFUSION AND THERAPEUTIC INJECTION, INITIAL ENCOUNTER: ICD-10-CM

## 2022-08-23 DIAGNOSIS — E11.9 TYPE 2 DIABETES MELLITUS WITHOUT COMPLICATION, WITHOUT LONG-TERM CURRENT USE OF INSULIN (HCC): ICD-10-CM

## 2022-08-23 DIAGNOSIS — E66.01 SEVERE OBESITY (BMI 35.0-39.9) WITH COMORBIDITY (HCC): ICD-10-CM

## 2022-08-23 DIAGNOSIS — J44.9 CHRONIC OBSTRUCTIVE PULMONARY DISEASE, UNSPECIFIED COPD TYPE (HCC): ICD-10-CM

## 2022-08-23 DIAGNOSIS — L30.9 ECZEMA, UNSPECIFIED TYPE: ICD-10-CM

## 2022-08-23 LAB
ABSOLUTE LYMPHOCYTE COUNT, 10803: 2.8 K/UL (ref 1–4.8)
APTT PPP: 24 SEC (ref 22–36)
BASOPHILS # BLD: 0 K/UL (ref 0–0.2)
BASOPHILS NFR BLD: 0 % (ref 0–2)
EOSINOPHIL # BLD: 0.2 K/UL (ref 0–0.5)
EOSINOPHIL NFR BLD: 2 % (ref 0–6)
ERYTHROCYTE [DISTWIDTH] IN BLOOD BY AUTOMATED COUNT: 15.4 % (ref 10–15.5)
GRANULOCYTES,GRANS: 61 % (ref 40–75)
HCT VFR BLD AUTO: 35.8 % (ref 35.1–48.3)
HGB BLD-MCNC: 11.5 G/DL (ref 11.7–16.1)
INR PPP: 1.02 (ref 0.89–1.29)
LYMPHOCYTES, LYMLT: 30 % (ref 20–45)
MCH RBC QN AUTO: 28 PG (ref 26–34)
MCHC RBC AUTO-ENTMCNC: 32 G/DL (ref 31–36)
MCV RBC AUTO: 86 FL (ref 80–99)
MONOCYTES # BLD: 0.7 K/UL (ref 0.1–1)
MONOCYTES NFR BLD: 7 % (ref 3–12)
NEUTROPHILS # BLD AUTO: 5.8 K/UL (ref 1.8–7.7)
PLATELET # BLD AUTO: 353 K/UL (ref 140–440)
PMV BLD AUTO: 10.6 FL (ref 9–13)
PROTHROMBIN TIME: 11 SEC (ref 9–13)
RBC # BLD AUTO: 4.16 M/UL (ref 3.8–5.2)
WBC # BLD AUTO: 9.4 K/UL (ref 4–11)

## 2022-08-23 PROCEDURE — G8754 DIAS BP LESS 90: HCPCS | Performed by: FAMILY MEDICINE

## 2022-08-23 PROCEDURE — 1101F PT FALLS ASSESS-DOCD LE1/YR: CPT | Performed by: FAMILY MEDICINE

## 2022-08-23 PROCEDURE — 1090F PRES/ABSN URINE INCON ASSESS: CPT | Performed by: FAMILY MEDICINE

## 2022-08-23 PROCEDURE — G8417 CALC BMI ABV UP PARAM F/U: HCPCS | Performed by: FAMILY MEDICINE

## 2022-08-23 PROCEDURE — G8399 PT W/DXA RESULTS DOCUMENT: HCPCS | Performed by: FAMILY MEDICINE

## 2022-08-23 PROCEDURE — 3017F COLORECTAL CA SCREEN DOC REV: CPT | Performed by: FAMILY MEDICINE

## 2022-08-23 PROCEDURE — 1123F ACP DISCUSS/DSCN MKR DOCD: CPT | Performed by: FAMILY MEDICINE

## 2022-08-23 PROCEDURE — 2022F DILAT RTA XM EVC RTNOPTHY: CPT | Performed by: FAMILY MEDICINE

## 2022-08-23 PROCEDURE — G8536 NO DOC ELDER MAL SCRN: HCPCS | Performed by: FAMILY MEDICINE

## 2022-08-23 PROCEDURE — G8752 SYS BP LESS 140: HCPCS | Performed by: FAMILY MEDICINE

## 2022-08-23 PROCEDURE — G8427 DOCREV CUR MEDS BY ELIG CLIN: HCPCS | Performed by: FAMILY MEDICINE

## 2022-08-23 PROCEDURE — G9717 DOC PT DX DEP/BP F/U NT REQ: HCPCS | Performed by: FAMILY MEDICINE

## 2022-08-23 PROCEDURE — 99214 OFFICE O/P EST MOD 30 MIN: CPT | Performed by: FAMILY MEDICINE

## 2022-08-23 PROCEDURE — G9899 SCRN MAM PERF RSLTS DOC: HCPCS | Performed by: FAMILY MEDICINE

## 2022-08-23 PROCEDURE — 3046F HEMOGLOBIN A1C LEVEL >9.0%: CPT | Performed by: FAMILY MEDICINE

## 2022-08-23 RX ORDER — HALOBETASOL PROPIONATE 0.5 MG/G
OINTMENT TOPICAL 2 TIMES DAILY
Qty: 50 G | Refills: 5 | Status: SHIPPED | OUTPATIENT
Start: 2022-08-23

## 2022-08-23 NOTE — PROGRESS NOTES
Walt Clement is a 78 y.o.  female and presents with    Chief Complaint   Patient presents with    Pre-op Exam           Subjective:  Pre op Physical   Patient here for a pre op physical exam.The patient reports problems - blurred vision  Do you take any herbs or supplements that were not prescribed by a doctor? yes Are you taking calcium supplements? yes Are you taking aspirin daily? not applicable    She is followed by Dr. Melissa Bautista for glaucoma. She has had this condition for 24 years. She can see color and outline of figures. She gets double vision in the evening. she is scheduled for lumbar puncture. She also sees an ophthalmology plastic surgeon and she has basal cell carcinoma below right lower eyelid. She had ablation with orthopedic pain, Dr. Radu Fernandez. Cardiovascular Review:  The patient has diabetes, hyperlipidemia and obesity. Diet and Lifestyle: not attempting to follow a low fat, low cholesterol diet, not attempting to follow a low sodium diet, does not rigorously follow a diabetic diet, sedentary, nonsmoker  Home BP Monitoring: is not measured at home. Pertinent ROS: taking medications as instructed, no medication side effects noted, no TIA's, no chest pain on exertion, no dyspnea on exertion, no swelling of ankles. Depression Review:  Patient is seen for followup of depression. Treatment includes lamotrigine, quetiapine, lorazepam and individual therapy. Ongoing symptoms include depressed mood, weight gain, insomnia, fatigue, feelings of worthlessness/guilt and difficulty concentrating. She denies recurrent thoughts of death. She experiences the following side effects from the treatment: none   She has chronic pain and is followed by orthopedic surgeon at this time. She has had 6 bilateral TMJ surgeries     She has hypothyroid.     ROS   General ROS: negative for - chills or fever  Psychological ROS: positive for - anxiety and sleep disturbances  Ophthalmic ROS: positive for - uses glasses  Endocrine ROS: negative for - polydipsia/polyuria or unexpected weight changes  Respiratory ROS: no cough, shortness of breath, or wheezing  Cardiovascular ROS: no chest pain or dyspnea on exertion  Gastrointestinal ROS: no abdominal pain, change in bowel habits, or black or bloody stools  Genito-Urinary ROS: no dysuria, trouble voiding, or hematuria  Neurological ROS: negative for - memory loss  Dermatological ROS: negative for - rash or skin lesion changes    All other systems reviewed and are negative. Objective:  Vitals:    08/23/22 1327   BP: 136/86   Pulse: 82   Resp: 16   Temp: 98.1 °F (36.7 °C)   TempSrc: Temporal   SpO2: 98%   Weight: 233 lb (105.7 kg)   Height: 5' 4\" (1.626 m)   PainSc:   0 - No pain   LMP: 03/03/1994       alert, well appearing, and in no distress, oriented to person, place, and time, and obese  Mental status - normal mood, behavior, speech, dress, motor activity, and thought processes  Chest - clear to auscultation, no wheezes, rales or rhonchi, symmetric air entry  Heart - normal rate, regular rhythm, normal S1, S2, no murmurs, rubs, clicks or gallops  Msk - left hand brace  Neuro - CN III-XII intact    LABS     TESTS  MRI head 7/6/2022  Impression        There is no acute ischemic change or hemorrhage. Occasional small nonspecific white matter lesion, most likely chronic microvascular ischemic change. There is a small developmental venous anomaly in the posterolateral left parietal lobe. The brain otherwise looks normal for age. Assessment/Plan:    1. Pre-op exam  Cleared for lumbar puncture Screen for colon cancer  Cologuard ordered    2. Chronic obstructive pulmonary disease, unspecified COPD type ([address]  Continue inhaled therapy    3. Severe obesity (BMI 35.0-39. 9) with comorbidity ([address]  I have reviewed/discussed the above normal BMI with the patient.   I have recommended the following interventions: dietary management education, guidance, and counseling and encourage exercise . Celestino Galarza 4. Autoimmune disease ([facility]  Continue treatment    5. Bipolar 2 disorder, major depressive episode (HCC)  Mood improved    6. Type 2 diabetes mellitus without complication, without long-term current use of insulin (HCC)  Goal hgb a1c; encourage low carb diet    7. Colon cancer screen  Cologuard ordered    Lab review: orders written for new lab studies as appropriate; see orders      I have discussed the diagnosis with the patient and the intended plan as seen in the above orders. The patient has received an after-visit summary and questions were answered concerning future plans. I have discussed medication side effects and warnings with the patient as well. I have reviewed the plan of care with the patient, accepted their input and they are in agreement with the treatment goals.

## 2022-08-23 NOTE — PROGRESS NOTES
Tomy Daughters presents today for   Chief Complaint   Patient presents with    Pre-op Exam       Is someone accompanying this pt? no    Is the patient using any DME equipment during OV? Yes a cane    Depression Screening:  3 most recent PHQ Screens 8/23/2022   PHQ Not Done -   Little interest or pleasure in doing things Not at all   Feeling down, depressed, irritable, or hopeless Not at all   Total Score PHQ 2 0   Trouble falling or staying asleep, or sleeping too much -   Feeling tired or having little energy -   Poor appetite, weight loss, or overeating -   Feeling bad about yourself - or that you are a failure or have let yourself or your family down -   Trouble concentrating on things such as school, work, reading, or watching TV -   Moving or speaking so slowly that other people could have noticed; or the opposite being so fidgety that others notice -   Thoughts of being better off dead, or hurting yourself in some way -   How difficult have these problems made it for you to do your work, take care of your home and get along with others -       Learning Assessment:  Learning Assessment 9/17/2020   PRIMARY LEARNER Patient   HIGHEST LEVEL OF EDUCATION - PRIMARY LEARNER  GRADUATED HIGH SCHOOL OR GED   BARRIERS PRIMARY LEARNER NONE   CO-LEARNER CAREGIVER No   PRIMARY LANGUAGE ENGLISH   LEARNER PREFERENCE PRIMARY LISTENING     -     -   ANSWERED BY Stephy Silvestre   RELATIONSHIP SELF       Abuse Screening:  Abuse Screening Questionnaire 11/11/2021   Do you ever feel afraid of your partner? N   Are you in a relationship with someone who physically or mentally threatens you? N   Is it safe for you to go home? Y       Fall Risk  Fall Risk Assessment, last 12 mths 8/23/2022   Able to walk? Yes   Fall in past 12 months? 0   Do you feel unsteady? 0   Are you worried about falling 0   Is TUG test greater than 12 seconds? -   Is the gait abnormal? -   Number of falls in past 12 months -   Fall with injury?  - Health Maintenance reviewed and discussed and ordered per Provider. Health Maintenance Due   Topic Date Due    Eye Exam Retinal or Dilated  Never done    Shingrix Vaccine Age 50> (2 of 2) 02/13/2019    Breast Cancer Screen Mammogram  07/11/2021    COVID-19 Vaccine (3 - Booster for Moderna series) 09/28/2021    Foot Exam Q1  05/18/2022    A1C test (Diabetic or Prediabetic)  05/24/2022   . Coordination of Care:  1. Have you been to the ER, urgent care clinic since your last visit? Hospitalized since your last visit? no    2. Have you seen or consulted any other health care providers outside of the [address] [place] [address] since your last visit? Include any pap smears or colon screening.  no      Last  Checked na  Last UDS Checked na  Last Pain contract signed: na    Pre-op exam

## 2022-09-06 NOTE — ACP (ADVANCE CARE PLANNING)
Advance Care Planning     Advance Care Planning (ACP) Physician/NP/PA Conversation      Date of Conversation: 11/11/2021  Conducted with: Patient with Decision Making Capacity    Healthcare Decision Maker:     Primary Decision Maker: Josefina Durán - [phone]  Click here to complete [address] including selection of the Healthcare Decision Maker Relationship (ie \"Primary\")      Today we documented Decision Maker(s) consistent with Legal Next of Kin hierarchy. Care Preferences:    Hospitalization: \"If your health worsens and it becomes clear that your chance of recovery is unlikely, what would be your preference regarding hospitalization? \"  The patient would prefer hospitalization. Ventilation: \"If you were unable to breathe on your own and your chance of recovery was unlikely, what would be your preference about the use of a ventilator (breathing machine) if it was available to you? \"   The patient would desire the use of a ventilator. Resuscitation: \"In the event your heart stopped as a result of an underlying serious health condition, would you want attempts to be made to restart your heart, or would you prefer a natural death? \"   Yes, attempt to resuscitate.     Additional topics discussed: treatment goals, benefit/burden of treatment options, ventilation preferences, hospitalization preferences and resuscitation preferences    Conversation Outcomes / Follow-Up Plan:   ACP in process - information provided, considering goals and options  Reviewed DNR/DNI and patient elects Full Code (Attempt Resuscitation)     Length of Voluntary ACP Conversation in minutes:  16 minutes    Corry Zheng MD Detail Level: Detailed Detail Level: Zone

## 2022-09-19 DIAGNOSIS — E89.0 POSTOPERATIVE HYPOTHYROIDISM: ICD-10-CM

## 2022-09-20 RX ORDER — LEVOTHYROXINE SODIUM 100 UG/1
TABLET ORAL
Qty: 90 TABLET | Refills: 1 | Status: SHIPPED | OUTPATIENT
Start: 2022-09-20

## 2022-09-23 DIAGNOSIS — E89.0 POSTOPERATIVE HYPOTHYROIDISM: ICD-10-CM

## 2022-09-23 DIAGNOSIS — H40.10X0 OPEN-ANGLE GLAUCOMA OF BOTH EYES, UNSPECIFIED GLAUCOMA STAGE, UNSPECIFIED OPEN-ANGLE GLAUCOMA TYPE: ICD-10-CM

## 2022-09-23 DIAGNOSIS — T80.1XXA VASCULAR COMPLICATIONS FOLLOWING INFUSION, TRANSFUSION AND THERAPEUTIC INJECTION, INITIAL ENCOUNTER: ICD-10-CM

## 2022-09-23 DIAGNOSIS — E66.01 SEVERE OBESITY (BMI 35.0-39.9) WITH COMORBIDITY (HCC): ICD-10-CM

## 2022-09-23 DIAGNOSIS — F31.81 BIPOLAR 2 DISORDER, MAJOR DEPRESSIVE EPISODE (HCC): ICD-10-CM

## 2022-09-23 DIAGNOSIS — Z01.818 PRE-OP EXAM: ICD-10-CM

## 2022-09-26 LAB — COLOGUARD TEST, EXTERNAL: NEGATIVE

## 2023-03-02 DIAGNOSIS — I10 ESSENTIAL (PRIMARY) HYPERTENSION: ICD-10-CM

## 2023-03-02 DIAGNOSIS — E89.0 POSTPROCEDURAL HYPOTHYROIDISM: ICD-10-CM

## 2023-03-08 RX ORDER — LEVOTHYROXINE SODIUM 0.1 MG/1
TABLET ORAL
Qty: 90 TABLET | Refills: 1 | Status: SHIPPED | OUTPATIENT
Start: 2023-03-08

## 2023-03-08 RX ORDER — NAPROXEN 500 MG/1
TABLET ORAL
Qty: 180 TABLET | Refills: 0 | Status: SHIPPED | OUTPATIENT
Start: 2023-03-08

## 2023-03-08 RX ORDER — LISINOPRIL 10 MG/1
TABLET ORAL
Qty: 90 TABLET | Refills: 3 | Status: SHIPPED | OUTPATIENT
Start: 2023-03-08

## 2023-03-29 DIAGNOSIS — E78.2 MIXED HYPERLIPIDEMIA: ICD-10-CM

## 2023-03-30 RX ORDER — ATORVASTATIN CALCIUM 40 MG/1
TABLET, FILM COATED ORAL
Qty: 90 TABLET | Refills: 3 | Status: SHIPPED | OUTPATIENT
Start: 2023-03-30

## 2023-04-01 DIAGNOSIS — I10 ESSENTIAL (PRIMARY) HYPERTENSION: ICD-10-CM

## 2023-04-05 RX ORDER — HYDROCHLOROTHIAZIDE 12.5 MG/1
CAPSULE, GELATIN COATED ORAL
Qty: 90 CAPSULE | Refills: 3 | OUTPATIENT
Start: 2023-04-05

## 2023-04-21 ENCOUNTER — TELEPHONE (OUTPATIENT)
Facility: CLINIC | Age: 68
End: 2023-04-21

## 2023-05-05 RX ORDER — HYDROCHLOROTHIAZIDE 12.5 MG/1
CAPSULE, GELATIN COATED ORAL
Qty: 90 CAPSULE | Refills: 3 | Status: SHIPPED | OUTPATIENT
Start: 2023-05-05

## 2023-07-28 DIAGNOSIS — E89.0 POSTPROCEDURAL HYPOTHYROIDISM: ICD-10-CM

## 2023-07-28 RX ORDER — LEVOTHYROXINE SODIUM 0.1 MG/1
TABLET ORAL
Qty: 90 TABLET | Refills: 1 | Status: SHIPPED | OUTPATIENT
Start: 2023-07-28

## 2023-08-07 RX ORDER — PANTOPRAZOLE SODIUM 40 MG/1
TABLET, DELAYED RELEASE ORAL
Qty: 90 TABLET | Refills: 3 | Status: SHIPPED | OUTPATIENT
Start: 2023-08-07

## 2023-09-26 ENCOUNTER — OFFICE VISIT (OUTPATIENT)
Facility: CLINIC | Age: 68
End: 2023-09-26

## 2023-09-26 VITALS
OXYGEN SATURATION: 98 % | HEART RATE: 69 BPM | TEMPERATURE: 97.6 F | DIASTOLIC BLOOD PRESSURE: 78 MMHG | SYSTOLIC BLOOD PRESSURE: 125 MMHG | WEIGHT: 236.4 LBS | BODY MASS INDEX: 40.36 KG/M2 | RESPIRATION RATE: 20 BRPM | HEIGHT: 64 IN

## 2023-09-26 DIAGNOSIS — E89.0 POSTPROCEDURAL HYPOTHYROIDISM: ICD-10-CM

## 2023-09-26 DIAGNOSIS — Z28.21 REFUSED INFLUENZA VACCINE: ICD-10-CM

## 2023-09-26 DIAGNOSIS — E78.2 MIXED HYPERLIPIDEMIA: ICD-10-CM

## 2023-09-26 DIAGNOSIS — M17.12 PRIMARY OSTEOARTHRITIS OF LEFT KNEE: ICD-10-CM

## 2023-09-26 DIAGNOSIS — F31.81 BIPOLAR II DISORDER (HCC): ICD-10-CM

## 2023-09-26 DIAGNOSIS — Z00.00 MEDICARE ANNUAL WELLNESS VISIT, SUBSEQUENT: Primary | ICD-10-CM

## 2023-09-26 DIAGNOSIS — E66.01 OBESITY, CLASS III, BMI 40-49.9 (MORBID OBESITY) (HCC): ICD-10-CM

## 2023-09-26 DIAGNOSIS — E11.9 TYPE 2 DIABETES MELLITUS WITHOUT COMPLICATION, WITHOUT LONG-TERM CURRENT USE OF INSULIN (HCC): ICD-10-CM

## 2023-09-26 DIAGNOSIS — I10 ESSENTIAL (PRIMARY) HYPERTENSION: ICD-10-CM

## 2023-09-26 PROBLEM — J44.9 CHRONIC OBSTRUCTIVE PULMONARY DISEASE, UNSPECIFIED COPD TYPE (HCC): Status: RESOLVED | Noted: 2022-08-23 | Resolved: 2023-09-26

## 2023-09-26 RX ORDER — DULAGLUTIDE 0.75 MG/.5ML
0.75 INJECTION, SOLUTION SUBCUTANEOUS WEEKLY
Qty: 4 ADJUSTABLE DOSE PRE-FILLED PEN SYRINGE | Refills: 0 | Status: SHIPPED | OUTPATIENT
Start: 2023-09-26

## 2023-09-26 RX ORDER — DULAGLUTIDE 0.75 MG/.5ML
0.75 INJECTION, SOLUTION SUBCUTANEOUS WEEKLY
Qty: 4 ADJUSTABLE DOSE PRE-FILLED PEN SYRINGE | Refills: 0 | Status: SHIPPED | OUTPATIENT
Start: 2023-09-26 | End: 2023-09-26 | Stop reason: SDUPTHER

## 2023-09-26 RX ORDER — QUETIAPINE FUMARATE 50 MG/1
TABLET, FILM COATED ORAL
COMMUNITY
Start: 2023-09-12

## 2023-09-26 SDOH — ECONOMIC STABILITY: FOOD INSECURITY: WITHIN THE PAST 12 MONTHS, THE FOOD YOU BOUGHT JUST DIDN'T LAST AND YOU DIDN'T HAVE MONEY TO GET MORE.: NEVER TRUE

## 2023-09-26 SDOH — ECONOMIC STABILITY: INCOME INSECURITY: HOW HARD IS IT FOR YOU TO PAY FOR THE VERY BASICS LIKE FOOD, HOUSING, MEDICAL CARE, AND HEATING?: NOT HARD AT ALL

## 2023-09-26 SDOH — ECONOMIC STABILITY: HOUSING INSECURITY
IN THE LAST 12 MONTHS, WAS THERE A TIME WHEN YOU DID NOT HAVE A STEADY PLACE TO SLEEP OR SLEPT IN A SHELTER (INCLUDING NOW)?: NO

## 2023-09-26 SDOH — ECONOMIC STABILITY: FOOD INSECURITY: WITHIN THE PAST 12 MONTHS, YOU WORRIED THAT YOUR FOOD WOULD RUN OUT BEFORE YOU GOT MONEY TO BUY MORE.: NEVER TRUE

## 2023-09-26 ASSESSMENT — PATIENT HEALTH QUESTIONNAIRE - PHQ9
9. THOUGHTS THAT YOU WOULD BE BETTER OFF DEAD, OR OF HURTING YOURSELF: 0
4. FEELING TIRED OR HAVING LITTLE ENERGY: 0
7. TROUBLE CONCENTRATING ON THINGS, SUCH AS READING THE NEWSPAPER OR WATCHING TELEVISION: 0
10. IF YOU CHECKED OFF ANY PROBLEMS, HOW DIFFICULT HAVE THESE PROBLEMS MADE IT FOR YOU TO DO YOUR WORK, TAKE CARE OF THINGS AT HOME, OR GET ALONG WITH OTHER PEOPLE: 0
5. POOR APPETITE OR OVEREATING: 0
3. TROUBLE FALLING OR STAYING ASLEEP: 0
SUM OF ALL RESPONSES TO PHQ QUESTIONS 1-9: 0
SUM OF ALL RESPONSES TO PHQ QUESTIONS 1-9: 0
6. FEELING BAD ABOUT YOURSELF - OR THAT YOU ARE A FAILURE OR HAVE LET YOURSELF OR YOUR FAMILY DOWN: 0
2. FEELING DOWN, DEPRESSED OR HOPELESS: 0
8. MOVING OR SPEAKING SO SLOWLY THAT OTHER PEOPLE COULD HAVE NOTICED. OR THE OPPOSITE, BEING SO FIGETY OR RESTLESS THAT YOU HAVE BEEN MOVING AROUND A LOT MORE THAN USUAL: 0
SUM OF ALL RESPONSES TO PHQ QUESTIONS 1-9: 0
SUM OF ALL RESPONSES TO PHQ QUESTIONS 1-9: 0

## 2023-09-27 ENCOUNTER — TELEPHONE (OUTPATIENT)
Facility: CLINIC | Age: 68
End: 2023-09-27

## 2023-09-27 LAB
ALBUMIN SERPL-MCNC: 4.4 G/DL (ref 3.9–4.9)
ALBUMIN/CREAT UR: 5 MG/G CREAT (ref 0–29)
ALBUMIN/GLOB SERPL: 1.9 {RATIO} (ref 1.2–2.2)
ALP SERPL-CCNC: 89 IU/L (ref 44–121)
ALT SERPL-CCNC: 16 IU/L (ref 0–32)
AST SERPL-CCNC: 12 IU/L (ref 0–40)
BILIRUB SERPL-MCNC: 0.3 MG/DL (ref 0–1.2)
BUN SERPL-MCNC: 24 MG/DL (ref 8–27)
BUN/CREAT SERPL: 32 (ref 12–28)
CALCIUM SERPL-MCNC: 9.8 MG/DL (ref 8.7–10.3)
CHLORIDE SERPL-SCNC: 100 MMOL/L (ref 96–106)
CHOLEST SERPL-MCNC: 213 MG/DL (ref 100–199)
CO2 SERPL-SCNC: 22 MMOL/L (ref 20–29)
CREAT SERPL-MCNC: 0.76 MG/DL (ref 0.57–1)
CREAT UR-MCNC: 139.4 MG/DL
EGFRCR SERPLBLD CKD-EPI 2021: 85 ML/MIN/1.73
GLOBULIN SER CALC-MCNC: 2.3 G/DL (ref 1.5–4.5)
GLUCOSE SERPL-MCNC: 113 MG/DL (ref 70–99)
HBA1C MFR BLD: 6.9 % (ref 4.8–5.6)
HDLC SERPL-MCNC: 41 MG/DL
LDLC SERPL CALC-MCNC: 149 MG/DL (ref 0–99)
MICROALBUMIN UR-MCNC: 6.7 UG/ML
POTASSIUM SERPL-SCNC: 4.3 MMOL/L (ref 3.5–5.2)
PROT SERPL-MCNC: 6.7 G/DL (ref 6–8.5)
SODIUM SERPL-SCNC: 139 MMOL/L (ref 134–144)
SPECIMEN STATUS REPORT: NORMAL
TRIGL SERPL-MCNC: 125 MG/DL (ref 0–149)
TSH SERPL DL<=0.005 MIU/L-ACNC: 5.35 UIU/ML (ref 0.45–4.5)
VLDLC SERPL CALC-MCNC: 23 MG/DL (ref 5–40)

## 2023-09-27 NOTE — TELEPHONE ENCOUNTER
Patient is calling about her medication about the following:    Dulaglutide (TRULICITY) 9.19 LY/7.9QN SOPN [[id number]      She was under the impression that it was just a 30 days supply.     Please advise    Thank you

## 2023-09-29 RX ORDER — LEVOTHYROXINE SODIUM 112 UG/1
TABLET ORAL
Qty: 90 TABLET | Refills: 3 | Status: SHIPPED | OUTPATIENT
Start: 2023-09-29

## 2023-09-29 ASSESSMENT — PATIENT HEALTH QUESTIONNAIRE - PHQ9
4. FEELING TIRED OR HAVING LITTLE ENERGY: 0
10. IF YOU CHECKED OFF ANY PROBLEMS, HOW DIFFICULT HAVE THESE PROBLEMS MADE IT FOR YOU TO DO YOUR WORK, TAKE CARE OF THINGS AT HOME, OR GET ALONG WITH OTHER PEOPLE: 0
7. TROUBLE CONCENTRATING ON THINGS, SUCH AS READING THE NEWSPAPER OR WATCHING TELEVISION: 0
SUM OF ALL RESPONSES TO PHQ QUESTIONS 1-9: 1
1. LITTLE INTEREST OR PLEASURE IN DOING THINGS: 1
9. THOUGHTS THAT YOU WOULD BE BETTER OFF DEAD, OR OF HURTING YOURSELF: 0
5. POOR APPETITE OR OVEREATING: 0
SUM OF ALL RESPONSES TO PHQ QUESTIONS 1-9: 1
SUM OF ALL RESPONSES TO PHQ9 QUESTIONS 1 & 2: 1
3. TROUBLE FALLING OR STAYING ASLEEP: 0
8. MOVING OR SPEAKING SO SLOWLY THAT OTHER PEOPLE COULD HAVE NOTICED. OR THE OPPOSITE, BEING SO FIGETY OR RESTLESS THAT YOU HAVE BEEN MOVING AROUND A LOT MORE THAN USUAL: 0
SUM OF ALL RESPONSES TO PHQ QUESTIONS 1-9: 1
SUM OF ALL RESPONSES TO PHQ QUESTIONS 1-9: 1
2. FEELING DOWN, DEPRESSED OR HOPELESS: 0
6. FEELING BAD ABOUT YOURSELF - OR THAT YOU ARE A FAILURE OR HAVE LET YOURSELF OR YOUR FAMILY DOWN: 0

## 2023-09-29 ASSESSMENT — LIFESTYLE VARIABLES
HOW MANY STANDARD DRINKS CONTAINING ALCOHOL DO YOU HAVE ON A TYPICAL DAY: PATIENT DOES NOT DRINK
HOW OFTEN DO YOU HAVE A DRINK CONTAINING ALCOHOL: NEVER

## 2023-10-04 RX ORDER — NAPROXEN 500 MG/1
500 TABLET ORAL 2 TIMES DAILY WITH MEALS
Qty: 180 TABLET | Refills: 0 | Status: CANCELLED | OUTPATIENT
Start: 2023-10-04

## 2023-10-04 RX ORDER — HYDROCHLOROTHIAZIDE 12.5 MG/1
CAPSULE, GELATIN COATED ORAL
Qty: 90 CAPSULE | Refills: 3 | Status: SHIPPED | OUTPATIENT
Start: 2023-10-04

## 2023-10-04 RX ORDER — NAPROXEN 500 MG/1
500 TABLET ORAL 2 TIMES DAILY WITH MEALS
Qty: 180 TABLET | Refills: 1 | Status: SHIPPED | OUTPATIENT
Start: 2023-10-04

## 2023-10-19 RX ORDER — HYDROCHLOROTHIAZIDE 12.5 MG/1
CAPSULE, GELATIN COATED ORAL
Qty: 90 CAPSULE | Refills: 1 | Status: SHIPPED | OUTPATIENT
Start: 2023-10-19

## 2023-10-19 NOTE — TELEPHONE ENCOUNTER
Medication requested :   Requested Prescriptions     Pending Prescriptions Disp Refills    hydroCHLOROthiazide (MICROZIDE) 12.5 MG capsule 90 capsule 1     Sig: TAKE 1 CAPSULE BY MOUTH EVERY DAY      PCP: Valentine Pedroza MD  LOV:           9/26/2023 NOV DMA: 10/26/2023  FUTURE APPT:   Future Appointments   Date Time Provider [address]   10/26/2023  8:00 AM Courtney Subramanian MD DMA BS AMB       Thank you.

## 2023-10-26 ENCOUNTER — OFFICE VISIT (OUTPATIENT)
Facility: CLINIC | Age: 68
End: 2023-10-26
Payer: MEDICARE

## 2023-10-26 VITALS
RESPIRATION RATE: 20 BRPM | DIASTOLIC BLOOD PRESSURE: 84 MMHG | HEART RATE: 73 BPM | BODY MASS INDEX: 40.36 KG/M2 | SYSTOLIC BLOOD PRESSURE: 138 MMHG | WEIGHT: 236.4 LBS | HEIGHT: 64 IN | OXYGEN SATURATION: 95 % | TEMPERATURE: 97.6 F

## 2023-10-26 DIAGNOSIS — F31.81 BIPOLAR II DISORDER (HCC): ICD-10-CM

## 2023-10-26 DIAGNOSIS — E89.0 POSTPROCEDURAL HYPOTHYROIDISM: ICD-10-CM

## 2023-10-26 DIAGNOSIS — E11.43 TYPE 2 DIABETES MELLITUS WITH DIABETIC AUTONOMIC NEUROPATHY, WITHOUT LONG-TERM CURRENT USE OF INSULIN (HCC): ICD-10-CM

## 2023-10-26 DIAGNOSIS — I10 ESSENTIAL (PRIMARY) HYPERTENSION: ICD-10-CM

## 2023-10-26 DIAGNOSIS — M17.12 PRIMARY OSTEOARTHRITIS OF LEFT KNEE: ICD-10-CM

## 2023-10-26 DIAGNOSIS — E11.9 TYPE 2 DIABETES MELLITUS WITHOUT COMPLICATION, WITHOUT LONG-TERM CURRENT USE OF INSULIN (HCC): Primary | ICD-10-CM

## 2023-10-26 DIAGNOSIS — Z00.00 ANNUAL PHYSICAL EXAM: ICD-10-CM

## 2023-10-26 DIAGNOSIS — M35.9 SYSTEMIC INVOLVEMENT OF CONNECTIVE TISSUE, UNSPECIFIED (HCC): ICD-10-CM

## 2023-10-26 DIAGNOSIS — E78.2 MIXED HYPERLIPIDEMIA: ICD-10-CM

## 2023-10-26 PROCEDURE — G0245 INITIAL FOOT EXAM PT LOPS: HCPCS | Performed by: FAMILY MEDICINE

## 2023-10-26 PROCEDURE — 3078F DIAST BP <80 MM HG: CPT | Performed by: FAMILY MEDICINE

## 2023-10-26 PROCEDURE — 99397 PER PM REEVAL EST PAT 65+ YR: CPT | Performed by: FAMILY MEDICINE

## 2023-10-26 PROCEDURE — 3074F SYST BP LT 130 MM HG: CPT | Performed by: FAMILY MEDICINE

## 2023-10-26 PROCEDURE — G8484 FLU IMMUNIZE NO ADMIN: HCPCS | Performed by: FAMILY MEDICINE

## 2023-10-26 ASSESSMENT — ENCOUNTER SYMPTOMS
VOMITING: 0
SHORTNESS OF BREATH: 0
NAUSEA: 1
CONSTIPATION: 0
DIARRHEA: 0
EYE PAIN: 0

## 2023-10-26 NOTE — PROGRESS NOTES
THIS IS A MEDICAL STUDENT NOTE AND IS FOR EDUCATIONAL PURPOSES ONLY    [facility]  75 y. o.female  10/26/23  8:09 AM      HPI: The pt presents to the clinic for a f/u on changes in her medications. The pt at this visit wants to stop taking trulicity, obtain a referral from endocrinology and rheumatology. The pt had nausea and fatigue from taking trulicity. The nausea and fatigue were relieved by eating, and the cost of the medication was reported as to high for the pt to afford. The pt requests to be rreffered to an endocrinologist. She is unsure about primary cares opinion on how her thyroid should be managed due to abnormal TSH lvls at last visit, PMH of thyroid cancer, and a past history of misdiagnosis from a primary care physician missing the thyroid cancer. The pt requests to be reffered to rheumatology. She has noticed inflammation in joints throughout her body.       PMH:  Past Medical History:   Diagnosis Date    Allergic rhinitis     Anxiety disorder     Arthritis 2/29/2012    Arthritis of knee     left  greater than right    Autoimmune disease (HCC)     Back pain     Bilateral cataracts     Bipolar 1 disorder ([address]) 4/12/2013    Bipolar disorder (HCC)     Chronic pain     Constipation, chronic     COPD (chronic obstructive pulmonary disease) (HCC)     Depression     Eczema     Fibromyalgia     Gastritis     GERD (gastroesophageal reflux disease)     Glaucoma 2/29/2012    Glaucoma     Hallux valgus     left foot    Hypertension     IBS (irritable bowel syndrome)     Ill-defined condition     Lumbago     Migraine headache     Nervous disorder     Neuritis     Osteoarthritis     Pain in joint, lower leg     Pain in joint, shoulder region     Pelvic relaxation disorder     Personality disorder [facility])     Psychiatric disorder     Bipolar    Psychosis ([address]) 2/29/2012    PTSD (post-traumatic stress disorder)     Schizophrenia ([address]) 2/29/2012    Scoliosis     Spondylolisthesis     TMJ

## 2023-10-26 NOTE — PROGRESS NOTES
Luis Albarado is a 75 y.o. presents today for No chief complaint on file. Is someone accompanying this pt? NO    Is the patient using any DME equipment during OV? CANE    Depression Screenin/29/2023    11:48 PM 2023     8:41 AM 2022     1:26 PM 2021    10:00 AM 2021    11:08 AM   PHQ-9 Questionaire   Little interest or pleasure in doing things 1  0 0 1   Feeling down, depressed, or hopeless 0 0 0 0 1   Trouble falling or staying asleep, or sleeping too much 0 0      Feeling tired or having little energy 0 0      Poor appetite or overeating 0 0      Feeling bad about yourself - or that you are a failure or have let yourself or your family down 0 0      Trouble concentrating on things, such as reading the newspaper or watching television 0 0      Moving or speaking so slowly that other people could have noticed. Or the opposite - being so fidgety or restless that you have been moving around a lot more than usual 0 0      Thoughts that you would be better off dead, or of hurting yourself in some way 0 0      PHQ-9 Total Score 1 0 0 0 2   If you checked off any problems, how difficult have these problems made it for you to do your work, take care of things at home, or get along with other people? 0 0          Abuse Screening:       No data to display                Learning Assessment:  No question data found. Fall Risk:      2023     8:40 AM   Fall Risk   2 or more falls in past year? no   Fall with injury in past year? no           Coordination of Care:   1. \"Have you been to the ER, urgent care clinic since your last visit? Hospitalized since your last visit? \" NO    2. \"Have you seen or consulted any other health care providers outside of the [address] [place] [address] since your last visit? \" [facility]    3. For patients aged 43-73: Has the patient had a colonoscopy / FIT/ Cologuard? NO    If the patient is female:    4. For patients aged 43-66:  Has

## 2023-10-28 LAB
THYROGLOB AB SERPL-ACNC: <1 IU/ML (ref 0–0.9)
THYROPEROXIDASE AB SERPL-ACNC: 19 IU/ML (ref 0–34)
TSH SERPL DL<=0.005 MIU/L-ACNC: 0.77 UIU/ML (ref 0.45–4.5)

## 2023-12-27 DIAGNOSIS — E78.2 MIXED HYPERLIPIDEMIA: ICD-10-CM

## 2023-12-27 RX ORDER — ATORVASTATIN CALCIUM 40 MG/1
40 TABLET, FILM COATED ORAL DAILY
Qty: 90 TABLET | Refills: 3 | Status: SHIPPED | OUTPATIENT
Start: 2023-12-27

## 2023-12-28 ENCOUNTER — TELEPHONE (OUTPATIENT)
Facility: CLINIC | Age: 68
End: 2023-12-28

## 2024-01-10 DIAGNOSIS — I10 ESSENTIAL (PRIMARY) HYPERTENSION: ICD-10-CM

## 2024-01-10 RX ORDER — LISINOPRIL 10 MG/1
10 TABLET ORAL DAILY
Qty: 90 TABLET | Refills: 3 | Status: SHIPPED | OUTPATIENT
Start: 2024-01-10

## 2024-03-04 RX ORDER — NAPROXEN 500 MG/1
500 TABLET ORAL 2 TIMES DAILY WITH MEALS
Qty: 180 TABLET | Refills: 3 | Status: SHIPPED | OUTPATIENT
Start: 2024-03-04

## 2024-03-12 RX ORDER — HYDROCHLOROTHIAZIDE 12.5 MG/1
CAPSULE, GELATIN COATED ORAL
Qty: 90 CAPSULE | Refills: 3 | Status: SHIPPED | OUTPATIENT
Start: 2024-03-12

## 2024-05-20 ENCOUNTER — OFFICE VISIT (OUTPATIENT)
Facility: CLINIC | Age: 69
End: 2024-05-20
Payer: MEDICARE

## 2024-05-20 VITALS
TEMPERATURE: 97.7 F | SYSTOLIC BLOOD PRESSURE: 115 MMHG | RESPIRATION RATE: 17 BRPM | BODY MASS INDEX: 41.25 KG/M2 | DIASTOLIC BLOOD PRESSURE: 82 MMHG | OXYGEN SATURATION: 96 % | HEART RATE: 78 BPM | HEIGHT: 64 IN | WEIGHT: 241.6 LBS

## 2024-05-20 DIAGNOSIS — E66.01 OBESITY, CLASS III, BMI 40-49.9 (MORBID OBESITY) (HCC): ICD-10-CM

## 2024-05-20 DIAGNOSIS — F31.81 BIPOLAR II DISORDER (HCC): ICD-10-CM

## 2024-05-20 DIAGNOSIS — E11.43 TYPE 2 DIABETES MELLITUS WITH DIABETIC AUTONOMIC NEUROPATHY, WITHOUT LONG-TERM CURRENT USE OF INSULIN (HCC): ICD-10-CM

## 2024-05-20 DIAGNOSIS — M35.9 SYSTEMIC INVOLVEMENT OF CONNECTIVE TISSUE, UNSPECIFIED (HCC): ICD-10-CM

## 2024-05-20 DIAGNOSIS — Z01.818 PRE-OP EXAM: Primary | ICD-10-CM

## 2024-05-20 DIAGNOSIS — M17.12 PRIMARY OSTEOARTHRITIS OF LEFT KNEE: ICD-10-CM

## 2024-05-20 PROCEDURE — 1036F TOBACCO NON-USER: CPT | Performed by: FAMILY MEDICINE

## 2024-05-20 PROCEDURE — G8417 CALC BMI ABV UP PARAM F/U: HCPCS | Performed by: FAMILY MEDICINE

## 2024-05-20 PROCEDURE — 1123F ACP DISCUSS/DSCN MKR DOCD: CPT | Performed by: FAMILY MEDICINE

## 2024-05-20 PROCEDURE — 3074F SYST BP LT 130 MM HG: CPT | Performed by: FAMILY MEDICINE

## 2024-05-20 PROCEDURE — 3046F HEMOGLOBIN A1C LEVEL >9.0%: CPT | Performed by: FAMILY MEDICINE

## 2024-05-20 PROCEDURE — 2022F DILAT RTA XM EVC RTNOPTHY: CPT | Performed by: FAMILY MEDICINE

## 2024-05-20 PROCEDURE — G8427 DOCREV CUR MEDS BY ELIG CLIN: HCPCS | Performed by: FAMILY MEDICINE

## 2024-05-20 PROCEDURE — 99214 OFFICE O/P EST MOD 30 MIN: CPT | Performed by: FAMILY MEDICINE

## 2024-05-20 PROCEDURE — 3079F DIAST BP 80-89 MM HG: CPT | Performed by: FAMILY MEDICINE

## 2024-05-20 PROCEDURE — 3017F COLORECTAL CA SCREEN DOC REV: CPT | Performed by: FAMILY MEDICINE

## 2024-05-20 PROCEDURE — G8400 PT W/DXA NO RESULTS DOC: HCPCS | Performed by: FAMILY MEDICINE

## 2024-05-20 PROCEDURE — 1090F PRES/ABSN URINE INCON ASSESS: CPT | Performed by: FAMILY MEDICINE

## 2024-05-20 RX ORDER — MELOXICAM 7.5 MG/1
7.5 TABLET ORAL DAILY
Qty: 30 TABLET | Refills: 3 | Status: SHIPPED | OUTPATIENT
Start: 2024-05-20

## 2024-05-20 RX ORDER — HYDROXYCHLOROQUINE SULFATE 200 MG/1
200 TABLET, FILM COATED ORAL 2 TIMES DAILY
COMMUNITY
Start: 2024-04-17

## 2024-05-20 SDOH — ECONOMIC STABILITY: INCOME INSECURITY: HOW HARD IS IT FOR YOU TO PAY FOR THE VERY BASICS LIKE FOOD, HOUSING, MEDICAL CARE, AND HEATING?: NOT VERY HARD

## 2024-05-20 SDOH — ECONOMIC STABILITY: FOOD INSECURITY: WITHIN THE PAST 12 MONTHS, THE FOOD YOU BOUGHT JUST DIDN'T LAST AND YOU DIDN'T HAVE MONEY TO GET MORE.: NEVER TRUE

## 2024-05-20 SDOH — ECONOMIC STABILITY: FOOD INSECURITY: WITHIN THE PAST 12 MONTHS, YOU WORRIED THAT YOUR FOOD WOULD RUN OUT BEFORE YOU GOT MONEY TO BUY MORE.: NEVER TRUE

## 2024-05-20 ASSESSMENT — PATIENT HEALTH QUESTIONNAIRE - PHQ9
7. TROUBLE CONCENTRATING ON THINGS, SUCH AS READING THE NEWSPAPER OR WATCHING TELEVISION: NOT AT ALL
SUM OF ALL RESPONSES TO PHQ QUESTIONS 1-9: 0
1. LITTLE INTEREST OR PLEASURE IN DOING THINGS: NOT AT ALL
8. MOVING OR SPEAKING SO SLOWLY THAT OTHER PEOPLE COULD HAVE NOTICED. OR THE OPPOSITE, BEING SO FIGETY OR RESTLESS THAT YOU HAVE BEEN MOVING AROUND A LOT MORE THAN USUAL: NOT AT ALL
9. THOUGHTS THAT YOU WOULD BE BETTER OFF DEAD, OR OF HURTING YOURSELF: NOT AT ALL
SUM OF ALL RESPONSES TO PHQ9 QUESTIONS 1 & 2: 0
SUM OF ALL RESPONSES TO PHQ QUESTIONS 1-9: 0
2. FEELING DOWN, DEPRESSED OR HOPELESS: NOT AT ALL
5. POOR APPETITE OR OVEREATING: NOT AT ALL
6. FEELING BAD ABOUT YOURSELF - OR THAT YOU ARE A FAILURE OR HAVE LET YOURSELF OR YOUR FAMILY DOWN: NOT AT ALL
10. IF YOU CHECKED OFF ANY PROBLEMS, HOW DIFFICULT HAVE THESE PROBLEMS MADE IT FOR YOU TO DO YOUR WORK, TAKE CARE OF THINGS AT HOME, OR GET ALONG WITH OTHER PEOPLE: NOT DIFFICULT AT ALL
SUM OF ALL RESPONSES TO PHQ QUESTIONS 1-9: 0
3. TROUBLE FALLING OR STAYING ASLEEP: NOT AT ALL
4. FEELING TIRED OR HAVING LITTLE ENERGY: NOT AT ALL
SUM OF ALL RESPONSES TO PHQ QUESTIONS 1-9: 0
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
## 2024-08-07 RX ORDER — PANTOPRAZOLE SODIUM 40 MG/1
TABLET, DELAYED RELEASE ORAL
Qty: 90 TABLET | Refills: 3 | Status: SHIPPED | OUTPATIENT
Start: 2024-08-07

## 2024-09-21 DIAGNOSIS — E89.0 POSTPROCEDURAL HYPOTHYROIDISM: ICD-10-CM

## 2024-09-24 RX ORDER — LEVOTHYROXINE SODIUM 112 UG/1
TABLET ORAL
Qty: 90 TABLET | Refills: 3 | Status: SHIPPED | OUTPATIENT
Start: 2024-09-24

## 2024-09-27 LAB
ESTIMATED AVERAGE GLUCOSE: NORMAL
HBA1C MFR BLD: 6.8 %

## 2024-10-15 ENCOUNTER — HOSPITAL ENCOUNTER (OUTPATIENT)
Facility: HOSPITAL | Age: 69
Setting detail: SPECIMEN
Discharge: HOME | End: 2024-10-18
Payer: MEDICARE

## 2024-10-15 PROCEDURE — 87075 CULTR BACTERIA EXCEPT BLOOD: CPT

## 2024-10-15 PROCEDURE — 87070 CULTURE OTHR SPECIMN AEROBIC: CPT

## 2024-10-17 LAB
BACTERIA SPEC CULT: NORMAL
BACTERIA SPEC CULT: NORMAL
SERVICE CMNT-IMP: NORMAL
SERVICE CMNT-IMP: NORMAL

## 2024-11-13 ENCOUNTER — COMMUNITY OUTREACH (OUTPATIENT)
Facility: CLINIC | Age: 69
End: 2024-11-13